# Patient Record
Sex: FEMALE | Race: BLACK OR AFRICAN AMERICAN | Employment: UNEMPLOYED | URBAN - METROPOLITAN AREA
[De-identification: names, ages, dates, MRNs, and addresses within clinical notes are randomized per-mention and may not be internally consistent; named-entity substitution may affect disease eponyms.]

---

## 2017-01-12 RX ORDER — BUTALBITAL, ACETAMINOPHEN AND CAFFEINE 50; 325; 40 MG/1; MG/1; MG/1
TABLET ORAL
Qty: 30 TAB | Refills: 0 | OUTPATIENT
Start: 2017-01-12

## 2017-01-13 RX ORDER — BUTALBITAL, ACETAMINOPHEN AND CAFFEINE 50; 325; 40 MG/1; MG/1; MG/1
TABLET ORAL
Qty: 30 TAB | Refills: 0 | Status: SHIPPED | OUTPATIENT
Start: 2017-01-13 | End: 2017-05-01 | Stop reason: SDUPTHER

## 2017-01-13 NOTE — TELEPHONE ENCOUNTER
Pt called back and said something that I could not understand and said she will take it over her( Dr. Priyanka Cordero) head, and hung up the phone.

## 2017-01-13 NOTE — TELEPHONE ENCOUNTER
I refilled this but she has not been seen since 12/2015  Please call and schedule f/u with Antonia Lazo or Mya Morfin for further refills to be done  Needs her at least yearly f/u visit  Thanks

## 2017-01-13 NOTE — TELEPHONE ENCOUNTER
Pt called at the number on file. Nurse informed the pt, the medication that was requested, was not approved, pt verbalize that was not right and became upset about it. A appointment was offered to the pt, because the pt had not been seen since 10/5/2017, and told that she could talk to Dr. Branden Du about the medication. Pt stated, \" That's (BS) and I'll find another doctor. \" Then the phone got hung up.

## 2017-01-18 ENCOUNTER — TELEPHONE (OUTPATIENT)
Dept: NEUROLOGY | Age: 62
End: 2017-01-18

## 2017-01-30 DIAGNOSIS — I10 ESSENTIAL HYPERTENSION WITH GOAL BLOOD PRESSURE LESS THAN 130/80: ICD-10-CM

## 2017-01-30 DIAGNOSIS — M79.7 FIBROMYALGIA: ICD-10-CM

## 2017-01-30 DIAGNOSIS — M54.50 MIDLINE LOW BACK PAIN WITHOUT SCIATICA, UNSPECIFIED CHRONICITY: ICD-10-CM

## 2017-01-30 RX ORDER — AMLODIPINE BESYLATE 10 MG/1
TABLET ORAL
Qty: 90 TAB | Refills: 1 | Status: SHIPPED | OUTPATIENT
Start: 2017-01-30 | End: 2017-04-07 | Stop reason: SDUPTHER

## 2017-01-30 RX ORDER — GABAPENTIN 300 MG/1
CAPSULE ORAL
Qty: 270 CAP | Refills: 1 | Status: SHIPPED | OUTPATIENT
Start: 2017-01-30 | End: 2017-04-07 | Stop reason: SDUPTHER

## 2017-01-30 RX ORDER — TOPIRAMATE 100 MG/1
TABLET, FILM COATED ORAL
Qty: 90 TAB | Refills: 1 | Status: SHIPPED | OUTPATIENT
Start: 2017-01-30 | End: 2017-04-07 | Stop reason: SDUPTHER

## 2017-01-30 RX ORDER — METOPROLOL SUCCINATE 25 MG/1
TABLET, EXTENDED RELEASE ORAL
Qty: 90 TAB | Refills: 1 | Status: SHIPPED | OUTPATIENT
Start: 2017-01-30 | End: 2017-04-07 | Stop reason: SDUPTHER

## 2017-02-20 ENCOUNTER — OFFICE VISIT (OUTPATIENT)
Dept: FAMILY MEDICINE CLINIC | Age: 62
End: 2017-02-20

## 2017-02-20 VITALS
HEART RATE: 86 BPM | TEMPERATURE: 97.2 F | HEIGHT: 70 IN | WEIGHT: 243.8 LBS | SYSTOLIC BLOOD PRESSURE: 146 MMHG | OXYGEN SATURATION: 96 % | RESPIRATION RATE: 19 BRPM | DIASTOLIC BLOOD PRESSURE: 97 MMHG | BODY MASS INDEX: 34.9 KG/M2

## 2017-02-20 DIAGNOSIS — J30.1 SEASONAL ALLERGIC RHINITIS DUE TO POLLEN: ICD-10-CM

## 2017-02-20 DIAGNOSIS — J45.20 ALLERGIC BRONCHITIS, MILD INTERMITTENT, UNCOMPLICATED: Primary | ICD-10-CM

## 2017-02-20 DIAGNOSIS — Z78.9 DISCOMFORT: ICD-10-CM

## 2017-02-20 PROBLEM — J45.909 ALLERGIC BRONCHITIS: Status: ACTIVE | Noted: 2017-02-20

## 2017-02-20 RX ORDER — IBUPROFEN 600 MG/1
600 TABLET ORAL
Qty: 40 TAB | Refills: 2 | Status: SHIPPED | OUTPATIENT
Start: 2017-02-20

## 2017-02-20 RX ORDER — FEXOFENADINE HCL 60 MG
60 TABLET ORAL 2 TIMES DAILY
Qty: 60 TAB | Refills: 11 | COMMUNITY
Start: 2017-02-20 | End: 2017-11-09 | Stop reason: ALTCHOICE

## 2017-02-20 NOTE — PROGRESS NOTES
HISTORY OF PRESENT ILLNESS  Jovan Eason is a 59 y.o. female. HPI  Cough with clear mucous x 2 weeks. Using albuterol without relief. Takes mucinex DM no help. No fever. No allergen exposure. Last episode of this was 6 mos ago, she was given albut by Dr Keysha Momin,   Lasted for weeks. Pt did not cough during interview or exam.       ROS  ROS:  Feeling poorly due to cough. No dyspnea or chest pain on exertion. No abdominal pain, change in bowel habits, black or bloody stools. No urinary tract symptoms. No neurological complaints. Physical Exam  Gen: Oriented to person, place and time and well-developed, well-nourished and in no distress. She sways back and forth constantly during exam.   HEENT:    Head: normocephalic and atraumatic. Eyes:  EOM are normal. Pupils equal and round. Neck:  Normal range of motion. Neck supple. Ears:  TMs and canals normal, no erythema, bulging or retraction  Mouth:  No posterior pharyngeal erythema or exudates. No tonsillar hypertrophy. Cardiovascular: normal rate, regular rhythm and normal heart sounds. Pulmonary/Chest:  Effort normal and breath sounds normal.  No respiratory distress. No wheezes, no rales. Abdominal: soft, normal  bowel sounds. Musculoskeletal:  No edema, no tenderness. No calf tenderness or edema. Neurological:  Alert, oriented to person, place and time. Skin: skin is warm and dry. ASSESSMENT and PLAN  Follow-up Disposition:  Return in about 1 month (around 3/20/2017) for follow up allergic bronchitis. 1. Discomfort     - ibuprofen (MOTRIN) 600 mg tablet; Take 1 Tab by mouth every six (6) hours as needed for Pain. Dispense: 40 Tab; Refill: 2    2. Seasonal allergic rhinitis due to pollen     - fexofenadine (ALLEGRA) 60 mg tablet; Take 1 Tab by mouth two (2) times a day. For allergy  Dispense: 60 Tab; Refill: 11    3.  Allergic bronchitis, mild intermittent, uncomplicated     I  have discussed the diagnosis with the patient and the intended treatment plan as seen in the above orders. Patient has provided input and agrees with goals. The patient has received an after-visit summary and questions were answered concerning future plans. I have discussed medication side effects and warnings with the patient as well.

## 2017-02-20 NOTE — MR AVS SNAPSHOT
Visit Information Date & Time Provider Department Dept. Phone Encounter #  
 2/20/2017  2:15 PM Sissy Mcfadden MD [address] [place] [address] [id number] Follow-up Instructions Return in about 1 month (around 3/20/2017) for follow up allergic bronchitis. Upcoming Health Maintenance Date Due Hepatitis C Screening 1955 DTaP/Tdap/Td series (1 - Tdap) 6/24/1974 PAP AKA CERVICAL CYTOLOGY 6/24/1974 ZOSTER VACCINE AGE 60> 6/24/2013 INFLUENZA AGE 9 TO ADULT 8/1/2016 FOBT Q 1 YEAR AGE 50-75 11/19/2016 BREAST CANCER SCRN MAMMOGRAM 12/29/2017 Allergies as of 2/20/2017  Review Complete On: 2/20/2017 By: Sissy Mcfadden MD  
 No Known Allergies Current Immunizations  Never Reviewed No immunizations on file. Not reviewed this visit You Were Diagnosed With   
  
 Codes Comments Allergic bronchitis, mild intermittent, uncomplicated    -  Primary ICD-10-CM: J45.20 ICD-9-CM: 493.00 Discomfort     ICD-10-CM: Z78.9 ICD-9-CM: V49.89 Seasonal allergic rhinitis due to pollen     ICD-10-CM: J30.1 ICD-9-CM: 477.0 Vitals BP Pulse Temp Resp Height(growth percentile) Weight(growth percentile) (!) 146/97 86 97.2 °F (36.2 °C) (Oral) 19 5' 10\" (1.778 m) 243 lb 12.8 oz (110.6 kg) SpO2 BMI OB Status Smoking Status 96% 34.98 kg/m2 Postmenopausal Former Smoker BMI and BSA Data Body Mass Index Body Surface Area 34.98 kg/m 2 2.34 m 2 Preferred Pharmacy Pharmacy Name Phone [facility] [address] [phone] Your Updated Medication List  
  
   
This list is accurate as of: 2/20/17  2:51 PM.  Always use your most recent med list.  
  
  
  
  
 albuterol 90 mcg/actuation inhaler Commonly known as:  PROVENTIL HFA, VENTOLIN HFA, PROAIR HFA Take 2 Puffs by inhalation every six (6) hours as needed for Wheezing. amLODIPine 10 mg tablet Commonly known as:  Voncile [place] Take 1 tablet by mouth  daily  
  
 butalbital-acetaminophen-caffeine -40 mg per tablet Commonly known as:  FIORICET, ESGIC  
take 1 tablet by mouth if needed --NOT MORE THAN 2 TABLETS IN 24 HOURS AND NO MORE THAN 2 DAYS A WEEK-NEEDS OFFICE VISIT FOR MORE REFILLS  
  
 cyclobenzaprine 10 mg tablet Commonly known as:  FLEXERIL Take 1 Tab by mouth three (3) times daily (with meals). diclofenac EC 75 mg EC tablet Commonly known as:  VOLTAREN Take 1 Tab by mouth two (2) times a day. fexofenadine 60 mg tablet Commonly known as:  Jovita Peat Take 1 Tab by mouth two (2) times a day. For allergy  
  
 fluticasone 50 mcg/actuation nasal spray Commonly known as:  Marsh Fails Use 2 sprays in each  nostril daily  
  
 gabapentin 300 mg capsule Commonly known as:  NEURONTIN Take 1 capsule by mouth 3  times daily  
  
 hydrOXYzine pamoate 25 mg capsule Commonly known as:  VISTARIL  
take 1 capsule by mouth if needed (NOT TO EXCEED 100 MG IN ONE DAY) ibuprofen 600 mg tablet Commonly known as:  MOTRIN Take 1 Tab by mouth every six (6) hours as needed for Pain.  
  
 magnesium oxide 400 mg tablet Commonly known as:  MAG-OX Take 1 Tab by mouth daily. metoprolol succinate 25 mg XL tablet Commonly known as:  TOPROL-XL Take 1 tablet by mouth once daily  
  
 topiramate 100 mg tablet Commonly known as:  TOPAMAX Take 1 tablet by mouth  daily Prescriptions Sent to Pharmacy Refills  
 ibuprofen (MOTRIN) 600 mg tablet 2 Sig: Take 1 Tab by mouth every six (6) hours as needed for Pain. Class: Normal  
 Pharmacy: [id number], [address] [facility] Ph #: [phone] Route: Oral  
  
Follow-up Instructions Return in about 1 month (around 3/20/2017) for follow up allergic bronchitis. Patient Instructions Purchase OTC Mucinex DM for cough relief. Take ibuprofen for pain, order sent to pharmacy. Continue allegra. Continue home multi - symptom night cough syrup. You may need to see an allergist in the future. Managing Your Allergies: Care Instructions Your Care Instructions Managing your allergies is an important part of staying healthy. Your doctor will help you find out what may be causing the allergies. Common causes of allergy symptoms are house dust and dust mites, animal dander, mold, and pollen. As soon as you know what triggers your symptoms, try to reduce your exposure to your triggers. This can help prevent allergy symptoms, asthma, and other health problems. Ask your doctor about allergy medicine or immunotherapy. These treatments may help reduce or prevent allergy symptoms. Follow-up care is a key part of your treatment and safety. Be sure to make and go to all appointments, and call your doctor if you are having problems. It's also a good idea to know your test results and keep a list of the medicines you take. How can you care for yourself at home? · If you think that dust or dust mites are causing your allergies: 
¨ Wash sheets, pillowcases, and other bedding every week in hot water. ¨ Use airtight, dust-proof covers for pillows, duvets, and mattresses. Avoid plastic covers, because they tend to tear quickly and do not \"breathe. \" Wash according to the instructions. ¨ Remove extra blankets and pillows that you don't need. ¨ Use blankets that are machine-washable. ¨ Don't use home humidifiers. They can help mites live longer. · Use air-conditioning. Change or clean all filters every month. Keep windows closed. Use high-efficiency air filters. Don't use window or attic fans, which draw dust into the air. · If you're allergic to pet dander, keep pets outside or, at the very least, out of your bedroom. Old carpet and cloth-covered furniture can hold a lot of animal dander. You may need to replace them. · Look for signs of cockroaches. Use cockroach baits to get rid of them. Then clean your home well. · If you're allergic to mold, don't keep indoor plants, because molds can grow in soil. Get rid of furniture, rugs, and drapes that smell musty. Check for mold in the bathroom. · If you're allergic to pollen, stay inside when pollen counts are high. · Don't smoke or let anyone else smoke in your house. Don't use fireplaces or wood-burning stoves. Avoid paint fumes, perfumes, and other strong odors. When should you call for help? Give an epinephrine shot if: 
· You think you are having a severe allergic reaction. · You have symptoms in more than one body area, such as mild nausea and an itchy mouth. After giving an epinephrine shot call 911, even if you feel better. Call 911 if: 
· You have symptoms of a severe allergic reaction. These may include: 
¨ Sudden raised, red areas (hives) all over your body. ¨ Swelling of the throat, mouth, lips, or tongue. ¨ Trouble breathing. ¨ Passing out (losing consciousness). Or you may feel very lightheaded or suddenly feel weak, confused, or restless. · You have been given an epinephrine shot, even if you feel better. Call your doctor now or seek immediate medical care if: 
· You have symptoms of an allergic reaction, such as: ¨ A rash or hives (raised, red areas on the skin). ¨ Itching. ¨ Swelling. ¨ Belly pain, nausea, or vomiting. Watch closely for changes in your health, and be sure to contact your doctor if: 
· Your allergies get worse. · You need help controlling your allergies. · You have questions about allergy testing. · You do not get better as expected. Where can you learn more? Go to http://jocelyn-sisi.info/. Enter L249 in the search box to learn more about \"Managing Your Allergies: Care Instructions. \" Current as of: February 12, 2016 Content Version: 11.1 © 1363-3364 Healthwise, Incorporated. Care instructions adapted under license by [employer] (which disclaims liability or warranty for this information). If you have questions about a medical condition or this instruction, always ask your healthcare professional. [address] any warranty or liability for your use of this information. Introducing [facility]! Tanya Wynne introduces Lumoid patient portal. Now you can access parts of your medical record, email your doctor's office, and request medication refills online. 1. In your internet browser, go to [email or web address] 2. Click on the First Time User? Click Here link in the Sign In box. You will see the New Member Sign Up page. 3. Enter your Lumoid Access Code exactly as it appears below. You will not need to use this code after youve completed the sign-up process. If you do not sign up before the expiration date, you must request a new code. · Lumoid Access Code: [email or web address] Expires: 5/21/2017  2:03 PM 
 
4. Enter the last four digits of your Social Security Number (xxxx) and Date of Birth (mm/dd/yyyy) as indicated and click Submit. You will be taken to the next sign-up page. 5. Create a Lumoid ID. This will be your Lumoid login ID and cannot be changed, so think of one that is secure and easy to remember. 6. Create a Lumoid password. You can change your password at any time. 7. Enter your Password Reset Question and Answer. This can be used at a later time if you forget your password. 8. Enter your e-mail address. You will receive e-mail notification when new information is available in [address] 9. Click Sign Up. You can now view and download portions of your medical record. 10. Click the Download Summary menu link to download a portable copy of your medical information.  
 
If you have questions, please visit the Frequently Asked Questions section of the [employer]. Remember, [employer] is NOT to be used for urgent needs. For medical emergencies, dial 911. Now available from your iPhone and Android! Please provide this summary of care documentation to your next provider. Your primary care clinician is listed as Estelle Ocampo. If you have any questions after today's visit, please call [phone].

## 2017-02-20 NOTE — PROGRESS NOTES
Chief Complaint   Patient presents with    Establish Care    Cough     Pt here to establish care with primary care     Pt C/O hard cough was DX with bronchitis by  but wants a second opinion and wants a different inhaler      Pt c/o chronic body pain all over from multiple issues states  gave her Zanaflex which to the pt is to strong pt was also given flexeril which she states is not strong enough.

## 2017-03-13 ENCOUNTER — TELEPHONE (OUTPATIENT)
Dept: FAMILY MEDICINE CLINIC | Age: 62
End: 2017-03-13

## 2017-03-13 NOTE — TELEPHONE ENCOUNTER
Spoke with pt to advise her that she needs to be seen in order to have a referral she would need to be evaluated by the provider. Pt stated that she was not going to be seen by the doctor since she knew what was wrong with her, she would pay on her own to be seen! And hung up on the nurse.

## 2017-03-13 NOTE — TELEPHONE ENCOUNTER
Patient called requesting a referral for Neurology, Dr. Sara Munoz for her foot.  Please advise patient contact [phone]

## 2017-03-15 ENCOUNTER — OFFICE VISIT (OUTPATIENT)
Dept: NEUROLOGY | Age: 62
End: 2017-03-15

## 2017-03-15 VITALS
OXYGEN SATURATION: 95 % | DIASTOLIC BLOOD PRESSURE: 102 MMHG | HEART RATE: 71 BPM | BODY MASS INDEX: 35.44 KG/M2 | SYSTOLIC BLOOD PRESSURE: 162 MMHG | WEIGHT: 247 LBS

## 2017-03-15 DIAGNOSIS — M54.42 ACUTE LEFT-SIDED LOW BACK PAIN WITH LEFT-SIDED SCIATICA: ICD-10-CM

## 2017-03-15 DIAGNOSIS — M54.42 ACUTE LEFT-SIDED LOW BACK PAIN WITH LEFT-SIDED SCIATICA: Primary | ICD-10-CM

## 2017-03-15 RX ORDER — TRAMADOL HYDROCHLORIDE 50 MG/1
50 TABLET ORAL
Qty: 60 TAB | Refills: 0 | Status: SHIPPED | OUTPATIENT
Start: 2017-03-15 | End: 2017-03-15 | Stop reason: SDUPTHER

## 2017-03-15 RX ORDER — TRAMADOL HYDROCHLORIDE 50 MG/1
50 TABLET ORAL
Qty: 60 TAB | Refills: 0 | Status: SHIPPED | OUTPATIENT
Start: 2017-03-15 | End: 2017-05-12 | Stop reason: SDUPTHER

## 2017-03-15 RX ORDER — TIZANIDINE 4 MG/1
4 TABLET ORAL AS NEEDED
Refills: 0 | COMMUNITY
Start: 2017-02-03 | End: 2017-06-20 | Stop reason: SDUPTHER

## 2017-03-15 NOTE — MR AVS SNAPSHOT
Visit Information Date & Time Provider Department Dept. Phone Encounter #  
 3/15/2017  9:00 AM Jonathan Banegas, [address] [facility] [phone] [id number] Follow-up Instructions Return if symptoms worsen or fail to improve. Upcoming Health Maintenance Date Due Hepatitis C Screening 1955 DTaP/Tdap/Td series (1 - Tdap) 6/24/1974 PAP AKA CERVICAL CYTOLOGY 6/24/1974 ZOSTER VACCINE AGE 60> 6/24/2013 INFLUENZA AGE 9 TO ADULT 8/1/2016 FOBT Q 1 YEAR AGE 50-75 11/19/2016 BREAST CANCER SCRN MAMMOGRAM 12/29/2017 Allergies as of 3/15/2017  Review Complete On: 3/15/2017 By: Michelle Grant LPN No Known Allergies Current Immunizations  Never Reviewed No immunizations on file. Not reviewed this visit You Were Diagnosed With   
  
 Codes Comments Acute left-sided low back pain with left-sided sciatica    -  Primary ICD-10-CM: M54.42 
ICD-9-CM: 724.2, 724.3 Vitals BP Pulse Weight(growth percentile) SpO2 BMI OB Status (!) 162/102 71 247 lb (112 kg) 95% 35.44 kg/m2 Postmenopausal  
 Smoking Status Former Smoker BMI and BSA Data Body Mass Index Body Surface Area  
 35.44 kg/m 2 2.35 m 2 Preferred Pharmacy Pharmacy Name Phone [facility] [address] [phone] Your Updated Medication List  
  
   
This list is accurate as of: 3/15/17 10:12 AM.  Always use your most recent med list.  
  
  
  
  
 albuterol 90 mcg/actuation inhaler Commonly known as:  PROVENTIL HFA, VENTOLIN HFA, PROAIR HFA Take 2 Puffs by inhalation every six (6) hours as needed for Wheezing. amLODIPine 10 mg tablet Commonly known as:  Arlyss Dania Take 1 tablet by mouth  daily  
  
 butalbital-acetaminophen-caffeine -40 mg per tablet Commonly known as:  Leilani Joann  
 take 1 tablet by mouth if needed --NOT MORE THAN 2 TABLETS IN 24 HOURS AND NO MORE THAN 2 DAYS A WEEK-NEEDS OFFICE VISIT FOR MORE REFILLS  
  
 cyclobenzaprine 10 mg tablet Commonly known as:  FLEXERIL Take 1 Tab by mouth three (3) times daily (with meals). diclofenac EC 75 mg EC tablet Commonly known as:  VOLTAREN Take 1 Tab by mouth two (2) times a day. fexofenadine 60 mg tablet Commonly known as:  Jimmy Flax Take 1 Tab by mouth two (2) times a day. For allergy  
  
 fluticasone 50 mcg/actuation nasal spray Commonly known as:  Cathlyn Jeffery Use 2 sprays in each  nostril daily  
  
 gabapentin 300 mg capsule Commonly known as:  NEURONTIN Take 1 capsule by mouth 3  times daily  
  
 hydrOXYzine pamoate 25 mg capsule Commonly known as:  VISTARIL  
take 1 capsule by mouth if needed (NOT TO EXCEED 100 MG IN ONE DAY) ibuprofen 600 mg tablet Commonly known as:  MOTRIN Take 1 Tab by mouth every six (6) hours as needed for Pain.  
  
 loratadine 10 mg Cap Take  by mouth.  
  
 magnesium oxide 400 mg tablet Commonly known as:  MAG-OX Take 1 Tab by mouth daily. metoprolol succinate 25 mg XL tablet Commonly known as:  TOPROL-XL Take 1 tablet by mouth once daily tiZANidine 4 mg tablet Commonly known as:  Jesisca Mule Take 4 mg by mouth as needed. topiramate 100 mg tablet Commonly known as:  TOPAMAX Take 1 tablet by mouth  daily  
  
 traMADol 50 mg tablet Commonly known as:  ULTRAM  
Take 1 Tab by mouth every six (6) hours as needed for Pain. Max Daily Amount: 200 mg. Prescriptions Printed Refills  
 traMADol (ULTRAM) 50 mg tablet 0 Sig: Take 1 Tab by mouth every six (6) hours as needed for Pain. Max Daily Amount: 200 mg. Class: Print Route: Oral  
  
Follow-up Instructions Return if symptoms worsen or fail to improve. To-Do List   
 03/15/2017 Imaging:  MRI LUMB SPINE WO CONT Patient Instructions PRESCRIPTION REFILL POLICY [facility] Statement to Patients April 1, 2014 In an effort to ensure the large volume of patient prescription refills is processed in the most efficient and expeditious manner, we are asking our patients to assist us by calling your Pharmacy for all prescription refills, this will include also your  Mail Order Pharmacy. The pharmacy will contact our office electronically to continue the refill process. Please do not wait until the last minute to call your pharmacy. We need at least 48 hours (2days) to fill prescriptions. We also encourage you to call your pharmacy before going to  your prescription to make sure it is ready. With regard to controlled substance prescription refill requests (narcotic refills) that need to be picked up at our office, we ask your cooperation by providing us with at least 72 hours (3days) notice that you will need a refill. We will not refill narcotic prescription refill requests after 4:00pm on any weekday, Monday through Thursday, or after 2:00pm on Fridays, or on the weekends. We encourage everyone to explore another way of getting your prescription refill request processed using [employer], our patient web portal through our electronic medical record system. [employer] is an efficient and effective way to communicate your medication request directly to the office and  downloadable as an jamie on your smart phone . [employer] also features a review functionality that allows you to view your medication list as well as leave messages for your physician. Are you ready to get connected? If so please review the attatched instructions or speak to any of our staff to get you set up right away! Thank you so much for your cooperation. Should you have any questions please contact our Practice Administrator. The Physicians and Staff,  [facility]! Aidan Avila introduces [employer] Website patient portal. Now you can access parts of your medical record, email your doctor's office, and request medication refills online. 1. In your internet browser, go to [email or web address] 2. Click on the First Time User? Click Here link in the Sign In box. You will see the New Member Sign Up page. 3. Enter your [employer] Website Access Code exactly as it appears below. You will not need to use this code after youve completed the sign-up process. If you do not sign up before the expiration date, you must request a new code. · [employer] Website Access Code: [email or web address] Expires: 5/21/2017  3:03 PM 
 
4. Enter the last four digits of your Social Security Number (xxxx) and Date of Birth (mm/dd/yyyy) as indicated and click Submit. You will be taken to the next sign-up page. 5. Create a [employer] Website ID. This will be your [employer] Website login ID and cannot be changed, so think of one that is secure and easy to remember. 6. Create a [employer] Website password. You can change your password at any time. 7. Enter your Password Reset Question and Answer. This can be used at a later time if you forget your password. 8. Enter your e-mail address. You will receive e-mail notification when new information is available in [address] 9. Click Sign Up. You can now view and download portions of your medical record. 10. Click the Download Summary menu link to download a portable copy of your medical information. If you have questions, please visit the Frequently Asked Questions section of the [employer] Website website. Remember, [employer] Website is NOT to be used for urgent needs. For medical emergencies, dial 911. Now available from your iPhone and Android! Please provide this summary of care documentation to your next provider. Your primary care clinician is listed as Elijah Holley. If you have any questions after today's visit, please call [phone].
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
## 2017-03-16 ENCOUNTER — TELEPHONE (OUTPATIENT)
Dept: NEUROLOGY | Age: 62
End: 2017-03-16

## 2017-03-16 NOTE — TELEPHONE ENCOUNTER
----- Message from Sena Meza sent at 3/16/2017  2:37 PM EDT -----  Regarding: Dr. Deanna Pa  Pt called concerning the appointment that she had yesterday with the provider. She is also requesting the .  Pt number [phone]

## 2017-03-18 DIAGNOSIS — J30.1 ALLERGIC RHINITIS DUE TO POLLEN: ICD-10-CM

## 2017-03-18 DIAGNOSIS — R09.82 POST-NASAL DRAINAGE: ICD-10-CM

## 2017-03-19 RX ORDER — FLUTICASONE PROPIONATE 50 MCG
SPRAY, SUSPENSION (ML) NASAL
Qty: 48 G | Refills: 3 | Status: SHIPPED | OUTPATIENT
Start: 2017-03-19

## 2017-03-21 ENCOUNTER — HOSPITAL ENCOUNTER (OUTPATIENT)
Dept: MRI IMAGING | Age: 62
Discharge: HOME | End: 2017-03-21
Attending: PSYCHIATRY & NEUROLOGY
Payer: MEDICARE

## 2017-03-21 ENCOUNTER — TELEPHONE (OUTPATIENT)
Dept: NEUROLOGY | Age: 62
End: 2017-03-21

## 2017-03-21 DIAGNOSIS — M54.42 ACUTE LEFT-SIDED LOW BACK PAIN WITH LEFT-SIDED SCIATICA: ICD-10-CM

## 2017-03-21 PROCEDURE — 72148 MRI LUMBAR SPINE W/O DYE: CPT

## 2017-03-21 NOTE — TELEPHONE ENCOUNTER
----- Message from Hui Zheng sent at 3/21/2017  2:08 PM EDT -----  Regarding: Dr. Parry Mortimer  : Pt returning call to Dr. Eveline Woods  nurse. Pt states, referred to [facility] does not have  name or the location to whom she is being referred so she can set her appt. Please give a call back . Best call back [phone].

## 2017-03-21 NOTE — TELEPHONE ENCOUNTER
----- Message from Yara Francisco sent at 3/21/2017  2:52 PM EDT -----  Regarding: Dr. Ta Strickland  Pt P) [phone]  or Birdie [phone] tried to schedule with the neurosurgeon but did not have a name or location that the MRI results were sent to so was unable to schedule the appointment. Pt would like to go to the [address] [place] [address] (p) [phone] location.

## 2017-03-21 NOTE — TELEPHONE ENCOUNTER
Notified patient MRI lumbar spine showing severe narrowing of the left foramen and narrowing of the left lateral recess posterior to L4. Patient verbalized understanding. Patient will f/u [facility].

## 2017-03-23 DIAGNOSIS — M79.7 FIBROMYALGIA: ICD-10-CM

## 2017-03-23 DIAGNOSIS — I10 ESSENTIAL HYPERTENSION WITH GOAL BLOOD PRESSURE LESS THAN 130/80: ICD-10-CM

## 2017-03-23 DIAGNOSIS — M54.50 MIDLINE LOW BACK PAIN WITHOUT SCIATICA, UNSPECIFIED CHRONICITY: ICD-10-CM

## 2017-03-23 DIAGNOSIS — J30.89 OTHER ALLERGIC RHINITIS: ICD-10-CM

## 2017-03-23 RX ORDER — GABAPENTIN 300 MG/1
CAPSULE ORAL
Qty: 270 CAP | Refills: 1 | Status: CANCELLED | OUTPATIENT
Start: 2017-03-23

## 2017-03-23 RX ORDER — METOPROLOL SUCCINATE 25 MG/1
TABLET, EXTENDED RELEASE ORAL
Qty: 90 TAB | Refills: 1 | Status: CANCELLED | OUTPATIENT
Start: 2017-03-23

## 2017-03-23 RX ORDER — TOPIRAMATE 100 MG/1
TABLET, FILM COATED ORAL
Qty: 90 TAB | Refills: 1 | Status: CANCELLED | OUTPATIENT
Start: 2017-03-23

## 2017-03-23 RX ORDER — AMLODIPINE BESYLATE 10 MG/1
TABLET ORAL
Qty: 90 TAB | Refills: 1 | Status: CANCELLED | OUTPATIENT
Start: 2017-03-23

## 2017-03-23 RX ORDER — HYDROXYZINE PAMOATE 25 MG/1
CAPSULE ORAL
Qty: 90 CAP | Refills: 0 | Status: CANCELLED | OUTPATIENT
Start: 2017-03-23

## 2017-04-07 DIAGNOSIS — I10 ESSENTIAL HYPERTENSION: ICD-10-CM

## 2017-04-07 DIAGNOSIS — I10 ESSENTIAL HYPERTENSION: Primary | ICD-10-CM

## 2017-04-07 DIAGNOSIS — M54.50 MIDLINE LOW BACK PAIN WITHOUT SCIATICA, UNSPECIFIED CHRONICITY: ICD-10-CM

## 2017-04-07 DIAGNOSIS — M79.7 FIBROMYALGIA: ICD-10-CM

## 2017-04-07 DIAGNOSIS — I10 ESSENTIAL HYPERTENSION WITH GOAL BLOOD PRESSURE LESS THAN 130/80: ICD-10-CM

## 2017-04-07 RX ORDER — HYDROXYZINE PAMOATE 25 MG/1
25 CAPSULE ORAL
Qty: 180 CAP | Refills: 0 | Status: CANCELLED | OUTPATIENT
Start: 2017-04-07

## 2017-04-07 RX ORDER — AMLODIPINE BESYLATE 10 MG/1
TABLET ORAL
Qty: 90 TAB | Refills: 0 | Status: SHIPPED | COMMUNITY
Start: 2017-04-07 | End: 2017-06-20 | Stop reason: SDUPTHER

## 2017-04-07 RX ORDER — AMLODIPINE BESYLATE 10 MG/1
TABLET ORAL
Qty: 90 TAB | Refills: 0 | Status: CANCELLED | OUTPATIENT
Start: 2017-04-07

## 2017-04-07 RX ORDER — METOPROLOL SUCCINATE 25 MG/1
25 TABLET, EXTENDED RELEASE ORAL DAILY
Qty: 90 TAB | Refills: 0 | Status: CANCELLED | OUTPATIENT
Start: 2017-04-07

## 2017-04-07 RX ORDER — GABAPENTIN 300 MG/1
300 CAPSULE ORAL 3 TIMES DAILY
Qty: 270 CAP | Refills: 0 | Status: CANCELLED | OUTPATIENT
Start: 2017-04-07

## 2017-04-07 RX ORDER — METOPROLOL SUCCINATE 25 MG/1
25 TABLET, EXTENDED RELEASE ORAL DAILY
Qty: 30 TAB | Refills: 0 | Status: SHIPPED | OUTPATIENT
Start: 2017-04-07 | End: 2017-04-07 | Stop reason: SDUPTHER

## 2017-04-07 RX ORDER — TOPIRAMATE 100 MG/1
TABLET, FILM COATED ORAL
Qty: 90 TAB | Refills: 0 | Status: SHIPPED | COMMUNITY
Start: 2017-04-07 | End: 2017-06-20 | Stop reason: SDUPTHER

## 2017-04-07 RX ORDER — GABAPENTIN 300 MG/1
300 CAPSULE ORAL 3 TIMES DAILY
Qty: 270 CAP | Refills: 0 | Status: SHIPPED | COMMUNITY
Start: 2017-04-07 | End: 2017-06-20 | Stop reason: SDUPTHER

## 2017-04-07 RX ORDER — HYDROXYZINE PAMOATE 25 MG/1
25 CAPSULE ORAL
Qty: 180 CAP | Refills: 0 | Status: SHIPPED | COMMUNITY
Start: 2017-04-07 | End: 2017-06-20 | Stop reason: SDUPTHER

## 2017-04-07 RX ORDER — METOPROLOL SUCCINATE 25 MG/1
25 TABLET, EXTENDED RELEASE ORAL DAILY
Qty: 90 TAB | Refills: 0 | Status: SHIPPED | COMMUNITY
Start: 2017-04-07 | End: 2017-06-20 | Stop reason: SDUPTHER

## 2017-04-07 RX ORDER — TOPIRAMATE 100 MG/1
TABLET, FILM COATED ORAL
Qty: 90 TAB | Refills: 0 | Status: CANCELLED | OUTPATIENT
Start: 2017-04-07

## 2017-04-08 ENCOUNTER — TELEPHONE (OUTPATIENT)
Dept: FAMILY MEDICINE CLINIC | Age: 62
End: 2017-04-08

## 2017-04-08 NOTE — TELEPHONE ENCOUNTER
----- Message from Abhishek Rich sent at 4/7/2017 12:15 PM EDT -----  Regarding: Dr. Valdez Choi  Patient needs refills on Amlodipine Besylate 10 mgs, Gabapentin 300 mgs, Metoprolo 25 mgs, Hydroxyzine/jennifer 25 mgs sent to [address] at [phone].

## 2017-04-11 ENCOUNTER — TELEPHONE (OUTPATIENT)
Dept: FAMILY MEDICINE CLINIC | Age: 62
End: 2017-04-11

## 2017-04-11 NOTE — TELEPHONE ENCOUNTER
----- Message from Zelda Gaucher sent at 4/10/2017  4:23 PM EDT -----  Regarding: Dr. Toby Tracy  Pt requesting if MRI results was received. Pt best contact number [phone].

## 2017-04-11 NOTE — TELEPHONE ENCOUNTER
----- Message from Krissy Collier sent at 4/11/2017 12:56 PM EDT -----  Regarding: /telephone  Contact: [phone]  Pt is requesting a call back regarding getting MRI results from Neurosurgeon so she get a referral. Neurologist Said pt has severe narrowing of the foramina and bulging disk at 3rd and 4th vertebrae.  Pt's best contact number is [phone]

## 2017-04-11 NOTE — TELEPHONE ENCOUNTER
I called pt. She has seen neurologist, had MRI, MRI report sent to neurosurgery. She needs referral to neurosurgery. Advised pt to see Dr Ashwini Simmons for any future evals or referrals, as I have seen pt only once in past and did not eval her for pain. D/w pt that [place] office closes this month. The patient agrees with plan and verbalizes understanding.

## 2017-05-04 ENCOUNTER — TELEPHONE (OUTPATIENT)
Dept: NEUROLOGY | Age: 62
End: 2017-05-04

## 2017-05-04 RX ORDER — BUTALBITAL, ACETAMINOPHEN AND CAFFEINE 50; 325; 40 MG/1; MG/1; MG/1
TABLET ORAL
Qty: 30 TAB | Refills: 0 | Status: SHIPPED | OUTPATIENT
Start: 2017-05-04 | End: 2017-07-01 | Stop reason: SDUPTHER

## 2017-05-12 DIAGNOSIS — M54.42 ACUTE LEFT-SIDED LOW BACK PAIN WITH LEFT-SIDED SCIATICA: ICD-10-CM

## 2017-05-12 RX ORDER — TRAMADOL HYDROCHLORIDE 50 MG/1
TABLET ORAL
Qty: 60 TAB | Refills: 0 | Status: SHIPPED | OUTPATIENT
Start: 2017-05-12 | End: 2018-09-07 | Stop reason: ALTCHOICE

## 2017-05-27 ENCOUNTER — HEALTH MAINTENANCE LETTER (OUTPATIENT)
Age: 62
End: 2017-05-27

## 2017-06-20 ENCOUNTER — OFFICE VISIT (OUTPATIENT)
Dept: FAMILY MEDICINE CLINIC | Age: 62
End: 2017-06-20

## 2017-06-20 VITALS
HEART RATE: 73 BPM | DIASTOLIC BLOOD PRESSURE: 84 MMHG | TEMPERATURE: 97 F | SYSTOLIC BLOOD PRESSURE: 125 MMHG | HEIGHT: 70 IN | OXYGEN SATURATION: 97 % | BODY MASS INDEX: 34.79 KG/M2 | WEIGHT: 243 LBS | RESPIRATION RATE: 20 BRPM

## 2017-06-20 DIAGNOSIS — A08.4 VIRAL GASTROENTERITIS: Primary | ICD-10-CM

## 2017-06-20 DIAGNOSIS — I10 ESSENTIAL HYPERTENSION WITH GOAL BLOOD PRESSURE LESS THAN 130/80: ICD-10-CM

## 2017-06-20 DIAGNOSIS — R09.82 POST-NASAL DRAINAGE: ICD-10-CM

## 2017-06-20 DIAGNOSIS — I10 ESSENTIAL HYPERTENSION: ICD-10-CM

## 2017-06-20 DIAGNOSIS — M79.7 FIBROMYALGIA: ICD-10-CM

## 2017-06-20 DIAGNOSIS — M54.42 ACUTE LEFT-SIDED LOW BACK PAIN WITH LEFT-SIDED SCIATICA: ICD-10-CM

## 2017-06-20 RX ORDER — HYDROXYZINE PAMOATE 25 MG/1
25 CAPSULE ORAL
Qty: 180 CAP | Refills: 1 | Status: SHIPPED | OUTPATIENT
Start: 2017-06-20 | End: 2017-11-06 | Stop reason: SDUPTHER

## 2017-06-20 RX ORDER — TIZANIDINE 4 MG/1
4 TABLET ORAL AS NEEDED
Qty: 30 TAB | Refills: 1 | Status: SHIPPED | OUTPATIENT
Start: 2017-06-20 | End: 2017-09-07 | Stop reason: SDUPTHER

## 2017-06-20 RX ORDER — DIPHENOXYLATE HYDROCHLORIDE AND ATROPINE SULFATE 2.5; .025 MG/1; MG/1
1 TABLET ORAL
Qty: 20 TAB | Refills: 0 | Status: SHIPPED | OUTPATIENT
Start: 2017-06-20

## 2017-06-20 RX ORDER — GABAPENTIN 300 MG/1
300 CAPSULE ORAL 3 TIMES DAILY
Qty: 270 CAP | Refills: 1 | Status: SHIPPED | OUTPATIENT
Start: 2017-06-20 | End: 2017-12-01 | Stop reason: SDUPTHER

## 2017-06-20 RX ORDER — TOPIRAMATE 100 MG/1
TABLET, FILM COATED ORAL
Qty: 90 TAB | Refills: 1 | Status: SHIPPED | OUTPATIENT
Start: 2017-06-20 | End: 2017-11-06 | Stop reason: SDUPTHER

## 2017-06-20 RX ORDER — METOPROLOL SUCCINATE 25 MG/1
25 TABLET, EXTENDED RELEASE ORAL DAILY
Qty: 90 TAB | Refills: 1 | Status: SHIPPED | OUTPATIENT
Start: 2017-06-20 | End: 2017-11-06 | Stop reason: SDUPTHER

## 2017-06-20 RX ORDER — AMLODIPINE BESYLATE 10 MG/1
TABLET ORAL
Qty: 90 TAB | Refills: 1 | Status: SHIPPED | OUTPATIENT
Start: 2017-06-20 | End: 2017-11-06 | Stop reason: SDUPTHER

## 2017-06-20 NOTE — PATIENT INSTRUCTIONS
Gastroenteritis: Care Instructions  Your Care Instructions  Gastroenteritis is an illness that may cause nausea, vomiting, and diarrhea. It is sometimes called \"stomach flu. \" It can be caused by bacteria or a virus. You will probably begin to feel better in 1 to 2 days. In the meantime, get plenty of rest and make sure you do not become dehydrated. Dehydration occurs when your body loses too much fluid. Follow-up care is a key part of your treatment and safety. Be sure to make and go to all appointments, and call your doctor if you are having problems. Its also a good idea to know your test results and keep a list of the medicines you take. How can you care for yourself at home? · If your doctor prescribed antibiotics, take them as directed. Do not stop taking them just because you feel better. You need to take the full course of antibiotics. · Drink plenty of fluids to prevent dehydration, enough so that your urine is light yellow or clear like water. Choose water and other caffeine-free clear liquids until you feel better. If you have kidney, heart, or liver disease and have to limit fluids, talk with your doctor before you increase your fluid intake. · Drink fluids slowly, in frequent, small amounts, because drinking too much too fast can cause vomiting. · Begin eating mild foods, such as dry toast, yogurt, applesauce, bananas, and rice. Avoid spicy, hot, or high-fat foods, and do not drink alcohol or caffeine for a day or two. Do not drink milk or eat ice cream until you are feeling better. How to prevent gastroenteritis  · Keep hot foods hot and cold foods cold. · Do not eat meats, dressings, salads, or other foods that have been kept at room temperature for more than 2 hours. · Use a thermometer to check your refrigerator. It should be between 34°F and 40°F.  · Defrost meats in the refrigerator or microwave, not on the kitchen counter. · Keep your hands and your kitchen clean.  Wash your hands, cutting boards, and countertops with hot soapy water frequently. · Cook meat until it is well done. · Do not eat raw eggs or uncooked sauces made with raw eggs. · Do not take chances. If food looks or tastes spoiled, throw it out. When should you call for help? Call 911 anytime you think you may need emergency care. For example, call if:  · You vomit blood or what looks like coffee grounds. · You passed out (lost consciousness). · You pass maroon or very bloody stools. Call your doctor now or seek immediate medical care if:  · You have severe belly pain. · You have signs of needing more fluids. You have sunken eyes, a dry mouth, and pass only a little dark urine. · You feel like you are going to faint. · You have increased belly pain that does not go away in 1 to 2 days. · You have new or increased nausea, or you are vomiting. · You have a new or higher fever. · Your stools are black and tarlike or have streaks of blood. Watch closely for changes in your health, and be sure to contact your doctor if:  · You are dizzy or lightheaded. · You urinate less than usual, or your urine is dark yellow or brown. · You do not feel better with each day that goes by. Where can you learn more? Go to http://jocelyn-sisi.info/. Enter N142 in the search box to learn more about \"Gastroenteritis: Care Instructions. \"  Current as of: March 3, 2017  Content Version: 11.3  © 6203-6879 [employer]. Care instructions adapted under license by [employer] (which disclaims liability or warranty for this information). If you have questions about a medical condition or this instruction, always ask your healthcare professional. [address] any warranty or liability for your use of this information.

## 2017-06-20 NOTE — MR AVS SNAPSHOT
Visit Information Date & Time Provider Department Dept. Phone Encounter #  
 6/20/2017 10:15 AM Marcela Reyes, SAMANTHA [address] [facility] [id number] Follow-up Instructions Return in about 4 weeks (around 7/18/2017) for Annual Wellness visit. Upcoming Health Maintenance Date Due Hepatitis C Screening 1955 DTaP/Tdap/Td series (1 - Tdap) 6/24/1974 PAP AKA CERVICAL CYTOLOGY 6/24/1974 ZOSTER VACCINE AGE 60> 6/24/2013 FOBT Q 1 YEAR AGE 50-75 11/19/2016 INFLUENZA AGE 9 TO ADULT 8/1/2017 BREAST CANCER SCRN MAMMOGRAM 12/29/2017 Allergies as of 6/20/2017  Review Complete On: 6/20/2017 By: Eriberto Jimenez No Known Allergies Current Immunizations  Never Reviewed No immunizations on file. Not reviewed this visit You Were Diagnosed With   
  
 Codes Comments Viral gastroenteritis    -  Primary ICD-10-CM: A08.4 ICD-9-CM: 008.8 Post-nasal drainage     ICD-10-CM: R09.82 ICD-9-CM: 473.9 Acute left-sided low back pain with left-sided sciatica     ICD-10-CM: M54.42 
ICD-9-CM: 724.2, 724.3 Essential hypertension     ICD-10-CM: I10 
ICD-9-CM: 401.9 Fibromyalgia     ICD-10-CM: M79.7 ICD-9-CM: 729.1 Essential hypertension with goal blood pressure less than 130/80     ICD-10-CM: I10 
ICD-9-CM: 401.9 Vitals BP Pulse Temp Resp Height(growth percentile) Weight(growth percentile) 125/84 (BP 1 Location: Right arm, BP Patient Position: Sitting) 73 97 °F (36.1 °C) (Oral) 20 5' 10\" (1.778 m) 243 lb (110.2 kg) SpO2 BMI OB Status Smoking Status 97% 34.87 kg/m2 Postmenopausal Former Smoker Vitals History BMI and BSA Data Body Mass Index Body Surface Area 34.87 kg/m 2 2.33 m 2 Preferred Pharmacy Pharmacy Name Phone [place] [id number] [facility] [address] [place] [address] [phone] Your Updated Medication List  
  
   
 This list is accurate as of: 6/20/17 11:28 AM.  Always use your most recent med list.  
  
  
  
  
 albuterol 90 mcg/actuation inhaler Commonly known as:  PROVENTIL HFA, VENTOLIN HFA, PROAIR HFA Take 2 Puffs by inhalation every six (6) hours as needed for Wheezing. amLODIPine 10 mg tablet Commonly known as:  Claudell Larissavolodymyrbach Take 1 tablet by mouth  daily  
  
 butalbital-acetaminophen-caffeine -40 mg per tablet Commonly known as:  FIORICET, ESGIC  
take 1 tablet by mouth if needed --NOT MORE THAN 2 TABLETS IN 24 HOURS AND NO MORE THAN 2 DAYS A WEEK  
  
 cyclobenzaprine 10 mg tablet Commonly known as:  FLEXERIL Take 1 Tab by mouth three (3) times daily (with meals). diclofenac EC 75 mg EC tablet Commonly known as:  VOLTAREN Take 1 Tab by mouth two (2) times a day. diphenoxylate-atropine 2.5-0.025 mg per tablet Commonly known as:  LOMOTIL Take 1 Tab by mouth four (4) times daily as needed for Diarrhea. Max Daily Amount: 4 Tabs. fexofenadine 60 mg tablet Commonly known as:  Reji Ana Take 1 Tab by mouth two (2) times a day. For allergy  
  
 fluticasone 50 mcg/actuation nasal spray Commonly known as:  Anjali Obi Use 2 sprays in each  nostril daily  
  
 gabapentin 300 mg capsule Commonly known as:  NEURONTIN Take 1 Cap by mouth three (3) times daily. hydrOXYzine pamoate 25 mg capsule Commonly known as:  VISTARIL Take 1 Cap by mouth daily as needed for Itching. May repeat once daily  
  
 ibuprofen 600 mg tablet Commonly known as:  MOTRIN Take 1 Tab by mouth every six (6) hours as needed for Pain.  
  
 loratadine 10 mg Cap Take  by mouth.  
  
 magnesium oxide 400 mg tablet Commonly known as:  MAG-OX Take 1 Tab by mouth daily. metoprolol succinate 25 mg XL tablet Commonly known as:  TOPROL-XL Take 1 Tab by mouth daily. tiZANidine 4 mg tablet Commonly known as:  Cinthia Hides Take 1 Tab by mouth as needed. topiramate 100 mg tablet Commonly known as:  TOPAMAX Take 1 tablet by mouth  daily  
  
 traMADol 50 mg tablet Commonly known as:  ULTRAM  
take 1 tablet by mouth every 6 hours if needed for pain --MAX OF 4 TABS DAILY Prescriptions Printed Refills diphenoxylate-atropine (LOMOTIL) 2.5-0.025 mg per tablet 0 Sig: Take 1 Tab by mouth four (4) times daily as needed for Diarrhea. Max Daily Amount: 4 Tabs. Class: Print Route: Oral  
  
Prescriptions Sent to Pharmacy Refills  
 tiZANidine (ZANAFLEX) 4 mg tablet 1 Sig: Take 1 Tab by mouth as needed. Class: Normal  
 Pharmacy: [facility], [address] [place] [address] Ph #: [phone] Route: Oral  
 metoprolol succinate (TOPROL-XL) 25 mg XL tablet 1 Sig: Take 1 Tab by mouth daily. Class: Normal  
 Pharmacy: [address] [place] [address] Ph #: [phone] Route: Oral  
 gabapentin (NEURONTIN) 300 mg capsule 1 Sig: Take 1 Cap by mouth three (3) times daily. Class: Normal  
 Pharmacy: [address] [place] [address] Ph #: [phone] Route: Oral  
 topiramate (TOPAMAX) 100 mg tablet 1 Sig: Take 1 tablet by mouth  daily Class: Normal  
 Pharmacy: [address] [place] [address] Ph #: [phone]  
 amLODIPine (NORVASC) 10 mg tablet 1 Sig: Take 1 tablet by mouth  daily Class: Normal  
 Pharmacy: [address] [place] [address] Ph #: [phone]  
 hydrOXYzine pamoate (VISTARIL) 25 mg capsule 1 Sig: Take 1 Cap by mouth daily as needed for Itching. May repeat once daily Class: Normal  
 Pharmacy: [address] [place] [address] Ph #: [phone] Route: Oral  
  
We Performed the Following REFERRAL TO PAIN MANAGEMENT [FXF514 Custom] Comments: Please evaluate patient for left low back pain with sciatica. Follow-up Instructions Return in about 4 weeks (around 7/18/2017) for Annual Wellness visit. Referral Information Referral ID Referred By Referred To  
  
 [id number] Shabbir [facility] [address] [facility] Visits Status Start Date End Date 1 New Request 6/20/17 6/20/18 If your referral has a status of pending review or denied, additional information will be sent to support the outcome of this decision. Patient Instructions Gastroenteritis: Care Instructions Your Care Instructions Gastroenteritis is an illness that may cause nausea, vomiting, and diarrhea. It is sometimes called \"stomach flu. \" It can be caused by bacteria or a virus. You will probably begin to feel better in 1 to 2 days. In the meantime, get plenty of rest and make sure you do not become dehydrated. Dehydration occurs when your body loses too much fluid. Follow-up care is a key part of your treatment and safety. Be sure to make and go to all appointments, and call your doctor if you are having problems. Its also a good idea to know your test results and keep a list of the medicines you take. How can you care for yourself at home? · If your doctor prescribed antibiotics, take them as directed. Do not stop taking them just because you feel better. You need to take the full course of antibiotics. · Drink plenty of fluids to prevent dehydration, enough so that your urine is light yellow or clear like water. Choose water and other caffeine-free clear liquids until you feel better. If you have kidney, heart, or liver disease and have to limit fluids, talk with your doctor before you increase your fluid intake. · Drink fluids slowly, in frequent, small amounts, because drinking too much too fast can cause vomiting. · Begin eating mild foods, such as dry toast, yogurt, applesauce, bananas, and rice. Avoid spicy, hot, or high-fat foods, and do not drink alcohol or caffeine for a day or two. Do not drink milk or eat ice cream until you are feeling better. How to prevent gastroenteritis · Keep hot foods hot and cold foods cold. · Do not eat meats, dressings, salads, or other foods that have been kept at room temperature for more than 2 hours. · Use a thermometer to check your refrigerator. It should be between 34°F and 40°F. 
· Defrost meats in the refrigerator or microwave, not on the kitchen counter. · Keep your hands and your kitchen clean. Wash your hands, cutting boards, and countertops with hot soapy water frequently. · Cook meat until it is well done. · Do not eat raw eggs or uncooked sauces made with raw eggs. · Do not take chances. If food looks or tastes spoiled, throw it out. When should you call for help? Call 911 anytime you think you may need emergency care. For example, call if: 
· You vomit blood or what looks like coffee grounds. · You passed out (lost consciousness). · You pass maroon or very bloody stools. Call your doctor now or seek immediate medical care if: 
· You have severe belly pain. · You have signs of needing more fluids. You have sunken eyes, a dry mouth, and pass only a little dark urine. · You feel like you are going to faint. · You have increased belly pain that does not go away in 1 to 2 days. · You have new or increased nausea, or you are vomiting. · You have a new or higher fever. · Your stools are black and tarlike or have streaks of blood. Watch closely for changes in your health, and be sure to contact your doctor if: 
· You are dizzy or lightheaded. · You urinate less than usual, or your urine is dark yellow or brown. · You do not feel better with each day that goes by. Where can you learn more? Go to http://jocelyn-sisi.info/. Enter N142 in the search box to learn more about \"Gastroenteritis: Care Instructions. \" Current as of: March 3, 2017 Content Version: 11.3 © 7540-5760 [employer], [employer]. Care instructions adapted under license by [employer] (which disclaims liability or warranty for this information). If you have questions about a medical condition or this instruction, always ask your healthcare professional. [address] any warranty or liability for your use of this information. Introducing [facility]! Ruth Goins introduces FFFavs patient portal. Now you can access parts of your medical record, email your doctor's office, and request medication refills online. 1. In your internet browser, go to [email or web address] 2. Click on the First Time User? Click Here link in the Sign In box. You will see the New Member Sign Up page. 3. Enter your FFFavs Access Code exactly as it appears below. You will not need to use this code after youve completed the sign-up process. If you do not sign up before the expiration date, you must request a new code. · FFFavs Access Code: [email or web address] Expires: 9/18/2017 11:28 AM 
 
4. Enter the last four digits of your Social Security Number (xxxx) and Date of Birth (mm/dd/yyyy) as indicated and click Submit. You will be taken to the next sign-up page. 5. Create a FFFavs ID. This will be your FFFavs login ID and cannot be changed, so think of one that is secure and easy to remember. 6. Create a FFFavs password. You can change your password at any time. 7. Enter your Password Reset Question and Answer. This can be used at a later time if you forget your password. 8. Enter your e-mail address. You will receive e-mail notification when new information is available in [address] 9. Click Sign Up. You can now view and download portions of your medical record. 10. Click the Download Summary menu link to download a portable copy of your medical information. If you have questions, please visit the Frequently Asked Questions section of the [employer] website. Remember, [employer] is NOT to be used for urgent needs. For medical emergencies, dial 911. Now available from your iPhone and Android! Please provide this summary of care documentation to your next provider. Your primary care clinician is listed as Natalia Prescott. If you have any questions after today's visit, please call [phone].
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
## 2017-06-21 ENCOUNTER — TELEPHONE (OUTPATIENT)
Dept: FAMILY MEDICINE CLINIC | Age: 62
End: 2017-06-21

## 2017-06-23 ENCOUNTER — TELEPHONE (OUTPATIENT)
Dept: FAMILY MEDICINE CLINIC | Age: 62
End: 2017-06-23

## 2017-06-23 RX ORDER — ONDANSETRON 4 MG/1
4 TABLET, ORALLY DISINTEGRATING ORAL
Qty: 15 TAB | Refills: 0 | Status: SHIPPED | OUTPATIENT
Start: 2017-06-23 | End: 2018-03-20 | Stop reason: SDUPTHER

## 2017-07-03 RX ORDER — BUTALBITAL, ACETAMINOPHEN AND CAFFEINE 50; 325; 40 MG/1; MG/1; MG/1
TABLET ORAL
Qty: 30 TAB | Refills: 0 | Status: SHIPPED | OUTPATIENT
Start: 2017-07-03 | End: 2018-03-02 | Stop reason: SDUPTHER

## 2017-07-17 ENCOUNTER — TELEPHONE (OUTPATIENT)
Dept: FAMILY MEDICINE CLINIC | Age: 62
End: 2017-07-17

## 2017-07-17 DIAGNOSIS — R19.7 DIARRHEA, UNSPECIFIED TYPE: Primary | ICD-10-CM

## 2017-07-17 NOTE — TELEPHONE ENCOUNTER
The patient states that her most recent lab work shows elevated levels of her bilirubin; the patient states that she has digestive issues 24 hours per day; the patient states that elevated bilirubin shows issues with the gallbladder and wanted to know if Timmyviji Kezia wants to run some tests on her gallbladder; the patient states that when she throws up it leaves a bitter taste in her mouth; the best contact number for the patient is [phone]; thank you    The patient's blood work should be in the system; Dr. Sheryl Martinez ordered the blood work; thank you

## 2017-07-18 ENCOUNTER — TELEPHONE (OUTPATIENT)
Dept: FAMILY MEDICINE CLINIC | Age: 62
End: 2017-07-18

## 2017-07-18 NOTE — TELEPHONE ENCOUNTER
Dr. Lindsey Nurse, pain management specialist in [place], needs medical records from our office before they will schedule an appointment; the best contact number for Dr. Grace Noble is [phone]; the fax number is [phone]; the patient is requesting we fax these ASAP so that she can be scheduled ASAP; thank you

## 2017-07-18 NOTE — TELEPHONE ENCOUNTER
Recommend consult with GI- patient has had ongoing issues with diarrhea and nausea since 2015. Referral entered to Dr. Abdifatah Luis.

## 2017-07-18 NOTE — TELEPHONE ENCOUNTER
Spoke with patient. Advised that her bilirubin was normal both times done here. She states that she vomits and has a funny taste in her mouth. Advised per Mariusz, see GI again for eval and treatment. Gave info for dr seen in 2015. Patient verbalized understanding and had no questions at this time.

## 2017-09-07 ENCOUNTER — TELEPHONE (OUTPATIENT)
Dept: FAMILY MEDICINE CLINIC | Age: 62
End: 2017-09-07

## 2017-09-07 DIAGNOSIS — M25.512 CHRONIC PAIN OF BOTH SHOULDERS: ICD-10-CM

## 2017-09-07 DIAGNOSIS — G89.29 CHRONIC BILATERAL LOW BACK PAIN, WITH SCIATICA PRESENCE UNSPECIFIED: Primary | ICD-10-CM

## 2017-09-07 DIAGNOSIS — G89.29 CHRONIC PAIN OF BOTH SHOULDERS: ICD-10-CM

## 2017-09-07 DIAGNOSIS — M54.5 CHRONIC BILATERAL LOW BACK PAIN, WITH SCIATICA PRESENCE UNSPECIFIED: Primary | ICD-10-CM

## 2017-09-07 DIAGNOSIS — M54.42 ACUTE LEFT-SIDED LOW BACK PAIN WITH LEFT-SIDED SCIATICA: ICD-10-CM

## 2017-09-07 DIAGNOSIS — M25.511 CHRONIC PAIN OF BOTH SHOULDERS: ICD-10-CM

## 2017-09-07 RX ORDER — TIZANIDINE 4 MG/1
4 TABLET ORAL AS NEEDED
Qty: 30 TAB | Refills: 1 | Status: SHIPPED | OUTPATIENT
Start: 2017-09-07 | End: 2017-09-08 | Stop reason: DRUGHIGH

## 2017-09-07 NOTE — TELEPHONE ENCOUNTER
Pharmacy needs clarification on directions for the following:    Tizanidine (ZANAFLEX) 4mg tablet    Take 1 Tab by mouth as needed. - needs to know how many per day

## 2017-09-07 NOTE — TELEPHONE ENCOUNTER
Patient needs new  Pain management referral for back and shoulders.  She will be seeing the following:    Jaleesa Herman MD  B#[id number]  [facility]  [address] [place] [address]  Phone: [phone]   Fax: [phone]

## 2017-09-08 RX ORDER — TIZANIDINE 4 MG/1
4 TABLET ORAL
Qty: 45 TAB | Refills: 1 | Status: SHIPPED | OUTPATIENT
Start: 2017-09-08 | End: 2017-10-06 | Stop reason: ALTCHOICE

## 2017-09-29 ENCOUNTER — TELEPHONE (OUTPATIENT)
Dept: NEUROLOGY | Age: 62
End: 2017-09-29

## 2017-09-29 NOTE — TELEPHONE ENCOUNTER
----- Message from Ministerio Armstrong sent at 9/29/2017  2:49 PM EDT -----  Regarding: Dr. Jaime Re / telephone  Pt is requesting a refill on her 'fioricet 50/325/40\" to be sent to the [facility] and best contact number  [phone] pt is out of this medication completely.

## 2017-10-06 DIAGNOSIS — M25.512 ACUTE PAIN OF LEFT SHOULDER: ICD-10-CM

## 2017-10-06 RX ORDER — CYCLOBENZAPRINE HCL 10 MG
TABLET ORAL
Qty: 90 TAB | Refills: 6 | Status: SHIPPED | OUTPATIENT
Start: 2017-10-06 | End: 2017-11-24

## 2017-10-31 ENCOUNTER — TELEPHONE (OUTPATIENT)
Dept: FAMILY MEDICINE CLINIC | Age: 62
End: 2017-10-31

## 2017-10-31 NOTE — TELEPHONE ENCOUNTER
Patient called due to medication denied for appointment. Patient says she has not been in to office due to her pain levels and dealing with sciatica. She said as soon as she figures out how she's going to come in she's make an appointment.  Patient would like a call back from nurse in regards to referral for pain management

## 2017-11-01 NOTE — TELEPHONE ENCOUNTER
Advised pt on 10/31/17 that she will need to be seen in office before any referrals will be completed. Pt verbalized understanding but stated she is unable to make it into our office due to increased pain. Also, advised pt that referrals had already been completed for patient previously. Pt verbalized understanding and stated she will call and schedule the appt once she is able to.

## 2017-11-24 ENCOUNTER — OFFICE VISIT (OUTPATIENT)
Dept: FAMILY MEDICINE CLINIC | Age: 62
End: 2017-11-24

## 2017-11-24 VITALS
WEIGHT: 241 LBS | HEIGHT: 70 IN | OXYGEN SATURATION: 98 % | BODY MASS INDEX: 34.5 KG/M2 | SYSTOLIC BLOOD PRESSURE: 129 MMHG | TEMPERATURE: 97.9 F | DIASTOLIC BLOOD PRESSURE: 91 MMHG | RESPIRATION RATE: 19 BRPM | HEART RATE: 83 BPM

## 2017-11-24 DIAGNOSIS — I10 ESSENTIAL HYPERTENSION WITH GOAL BLOOD PRESSURE LESS THAN 130/80: ICD-10-CM

## 2017-11-24 DIAGNOSIS — I10 ESSENTIAL HYPERTENSION: ICD-10-CM

## 2017-11-24 DIAGNOSIS — R13.19 OTHER DYSPHAGIA: Primary | ICD-10-CM

## 2017-11-24 RX ORDER — TOPIRAMATE 100 MG/1
TABLET, FILM COATED ORAL
Qty: 90 TAB | Refills: 1 | Status: SHIPPED | OUTPATIENT
Start: 2017-11-24 | End: 2018-03-02 | Stop reason: DRUGHIGH

## 2017-11-24 RX ORDER — AMLODIPINE BESYLATE 10 MG/1
TABLET ORAL
Qty: 90 TAB | Refills: 1 | Status: SHIPPED | OUTPATIENT
Start: 2017-11-24 | End: 2018-03-02 | Stop reason: SDUPTHER

## 2017-11-24 RX ORDER — METOPROLOL SUCCINATE 25 MG/1
TABLET, EXTENDED RELEASE ORAL
Qty: 90 TAB | Refills: 1 | Status: SHIPPED | OUTPATIENT
Start: 2017-11-24 | End: 2018-03-02 | Stop reason: SDUPTHER

## 2017-11-24 RX ORDER — OMEPRAZOLE 20 MG/1
20 CAPSULE, DELAYED RELEASE ORAL DAILY
COMMUNITY
Start: 2017-10-05 | End: 2018-03-20 | Stop reason: DRUGHIGH

## 2017-11-24 RX ORDER — TIZANIDINE 4 MG/1
4 TABLET ORAL
Qty: 90 TAB | Refills: 1 | Status: SHIPPED | OUTPATIENT
Start: 2017-11-24 | End: 2018-03-02 | Stop reason: SDUPTHER

## 2017-11-24 RX ORDER — HYDROXYZINE PAMOATE 25 MG/1
CAPSULE ORAL
Qty: 90 CAP | Refills: 1 | Status: SHIPPED | OUTPATIENT
Start: 2017-11-24 | End: 2018-03-02 | Stop reason: SDUPTHER

## 2017-11-24 RX ORDER — TIZANIDINE 4 MG/1
4 TABLET ORAL
Refills: 0 | COMMUNITY
Start: 2017-10-06 | End: 2017-11-24 | Stop reason: SDUPTHER

## 2017-11-24 NOTE — PATIENT INSTRUCTIONS

## 2017-11-24 NOTE — MR AVS SNAPSHOT
Visit Information Date & Time Provider Department Dept. Phone Encounter #  
 11/24/2017  9:00 AM Lei Beaver MD [facility] [id number] Follow-up Instructions Return in about 3 months (around 2/24/2018). Upcoming Health Maintenance Date Due Hepatitis C Screening 1955 MEDICARE YEARLY EXAM 6/24/1971 DTaP/Tdap/Td series (1 - Tdap) 6/24/1974 PAP AKA CERVICAL CYTOLOGY 6/24/1974 ZOSTER VACCINE AGE 60> 4/24/2013 FOBT Q 1 YEAR AGE 50-75 11/19/2016 Influenza Age 5 to Adult 8/1/2017 BREAST CANCER SCRN MAMMOGRAM 8/25/2019 Allergies as of 11/24/2017  Review Complete On: 11/24/2017 By: Lei Beaver MD  
 No Known Allergies Current Immunizations  Never Reviewed No immunizations on file. Not reviewed this visit You Were Diagnosed With   
  
 Codes Comments Other dysphagia    -  Primary ICD-10-CM: R13.19 ICD-9-CM: 787.29 Essential hypertension     ICD-10-CM: I10 
ICD-9-CM: 401.9 Essential hypertension with goal blood pressure less than 130/80     ICD-10-CM: I10 
ICD-9-CM: 401.9 Vitals BP Pulse Temp Resp Height(growth percentile) Weight(growth percentile) (!) 129/91 83 97.9 °F (36.6 °C) (Oral) 19 5' 10\" (1.778 m) 241 lb (109.3 kg) SpO2 BMI OB Status Smoking Status 98% 34.58 kg/m2 Postmenopausal Former Smoker Vitals History BMI and BSA Data Body Mass Index Body Surface Area 34.58 kg/m 2 2.32 m 2 Preferred Pharmacy Pharmacy Name Phone [address] [facility] [address] [place] [address] [phone] Your Updated Medication List  
  
   
This list is accurate as of: 11/24/17  9:51 AM.  Always use your most recent med list.  
  
  
  
  
 albuterol 90 mcg/actuation inhaler Commonly known as:  PROVENTIL HFA, VENTOLIN HFA, PROAIR HFA Take 2 Puffs by inhalation every six (6) hours as needed for Wheezing. amLODIPine 10 mg tablet Commonly known as:  Danette Nereida TAKE 1 TABLET EVERY DAY  
  
 butalbital-acetaminophen-caffeine -40 mg per tablet Commonly known as:  FIORICET, ESGIC  
take 1 tablet by mouth if needed --NO MORE THAN 2 TABLETS IN 24 HRS AND NO MORE THAN 2 DAYS A WEEK  
  
 diclofenac EC 75 mg EC tablet Commonly known as:  VOLTAREN Take 1 Tab by mouth two (2) times a day. diphenoxylate-atropine 2.5-0.025 mg per tablet Commonly known as:  LOMOTIL Take 1 Tab by mouth four (4) times daily as needed for Diarrhea. Max Daily Amount: 4 Tabs. fluticasone 50 mcg/actuation nasal spray Commonly known as:  Michaelyn Drought Use 2 sprays in each  nostril daily  
  
 gabapentin 300 mg capsule Commonly known as:  NEURONTIN Take 1 Cap by mouth three (3) times daily. hydrOXYzine pamoate 25 mg capsule Commonly known as:  VISTARIL  
TAKE 1 CAPSULE EVERY DAY AS NEEDED  FOR  ITCHING MAY REPEAT ONE TIME DAILY  
  
 ibuprofen 600 mg tablet Commonly known as:  MOTRIN Take 1 Tab by mouth every six (6) hours as needed for Pain.  
  
 magnesium oxide 400 mg tablet Commonly known as:  MAG-OX Take 1 Tab by mouth daily. metoprolol succinate 25 mg XL tablet Commonly known as:  TOPROL-XL  
TAKE 1 TABLET EVERY DAY  
  
 omeprazole 20 mg capsule Commonly known as:  PRILOSEC Take 20 mg by mouth daily. ondansetron 4 mg disintegrating tablet Commonly known as:  ZOFRAN ODT Take 1 Tab by mouth every eight (8) hours as needed for Nausea. tiZANidine 4 mg tablet Commonly known as:  Roberto Levy Take 1 Tab by mouth nightly. topiramate 100 mg tablet Commonly known as:  TOPAMAX TAKE 1 TABLET EVERY DAY  
  
 traMADol 50 mg tablet Commonly known as:  ULTRAM  
take 1 tablet by mouth every 6 hours if needed for pain --MAX OF 4 TABS DAILY Prescriptions Sent to Pharmacy  Refills  
 metoprolol succinate (TOPROL-XL) 25 mg XL tablet 1  
 Sig: TAKE 1 TABLET EVERY DAY Class: Normal  
 Pharmacy: [address] [place] [address] Ph #: [phone]  
 amLODIPine (NORVASC) 10 mg tablet 1 Sig: TAKE 1 TABLET EVERY DAY Class: Normal  
 Pharmacy: [address] [place] [address] Ph #: [phone]  
 topiramate (TOPAMAX) 100 mg tablet 1 Sig: TAKE 1 TABLET EVERY DAY Class: Normal  
 Pharmacy: [address] [place] [address] Ph #: [phone]  
 hydrOXYzine pamoate (VISTARIL) 25 mg capsule 1 Sig: TAKE 1 CAPSULE EVERY DAY AS NEEDED  FOR  ITCHING MAY REPEAT ONE TIME DAILY Class: Normal  
 Pharmacy: [address] [place] [address] Ph #: [phone]  
 tiZANidine (ZANAFLEX) 4 mg tablet 1 Sig: Take 1 Tab by mouth nightly. Class: Normal  
 Pharmacy: [address] [place] [address] Ph #: [phone] Route: Oral  
  
We Performed the Following METABOLIC PANEL, COMPREHENSIVE [86850 CPT(R)] Follow-up Instructions Return in about 3 months (around 2/24/2018). Patient Instructions DASH Diet: Care Instructions Your Care Instructions The DASH diet is an eating plan that can help lower your blood pressure. DASH stands for Dietary Approaches to Stop Hypertension. Hypertension is high blood pressure. The DASH diet focuses on eating foods that are high in calcium, potassium, and magnesium. These nutrients can lower blood pressure. The foods that are highest in these nutrients are fruits, vegetables, low-fat dairy products, nuts, seeds, and legumes. But taking calcium, potassium, and magnesium supplements instead of eating foods that are high in those nutrients does not have the same effect. The DASH diet also includes whole grains, fish, and poultry.  
The DASH diet is one of several lifestyle changes your doctor may recommend to lower your high blood pressure. Your doctor may also want you to decrease the amount of sodium in your diet. Lowering sodium while following the DASH diet can lower blood pressure even further than just the DASH diet alone. Follow-up care is a key part of your treatment and safety. Be sure to make and go to all appointments, and call your doctor if you are having problems. It's also a good idea to know your test results and keep a list of the medicines you take. How can you care for yourself at home? Following the DASH diet · Eat 4 to 5 servings of fruit each day. A serving is 1 medium-sized piece of fruit, ½ cup chopped or canned fruit, 1/4 cup dried fruit, or 4 ounces (½ cup) of fruit juice. Choose fruit more often than fruit juice. · Eat 4 to 5 servings of vegetables each day. A serving is 1 cup of lettuce or raw leafy vegetables, ½ cup of chopped or cooked vegetables, or 4 ounces (½ cup) of vegetable juice. Choose vegetables more often than vegetable juice. · Get 2 to 3 servings of low-fat and fat-free dairy each day. A serving is 8 ounces of milk, 1 cup of yogurt, or 1 ½ ounces of cheese. · Eat 6 to 8 servings of grains each day. A serving is 1 slice of bread, 1 ounce of dry cereal, or ½ cup of cooked rice, pasta, or cooked cereal. Try to choose whole-grain products as much as possible. · Limit lean meat, poultry, and fish to 2 servings each day. A serving is 3 ounces, about the size of a deck of cards. · Eat 4 to 5 servings of nuts, seeds, and legumes (cooked dried beans, lentils, and split peas) each week. A serving is 1/3 cup of nuts, 2 tablespoons of seeds, or ½ cup of cooked beans or peas. · Limit fats and oils to 2 to 3 servings each day. A serving is 1 teaspoon of vegetable oil or 2 tablespoons of salad dressing. · Limit sweets and added sugars to 5 servings or less a week. A serving is 1 tablespoon jelly or jam, ½ cup sorbet, or 1 cup of lemonade. · Eat less than 2,300 milligrams (mg) of sodium a day. If you limit your sodium to 1,500 mg a day, you can lower your blood pressure even more. Tips for success · Start small. Do not try to make dramatic changes to your diet all at once. You might feel that you are missing out on your favorite foods and then be more likely to not follow the plan. Make small changes, and stick with them. Once those changes become habit, add a few more changes. · Try some of the following: ¨ Make it a goal to eat a fruit or vegetable at every meal and at snacks. This will make it easy to get the recommended amount of fruits and vegetables each day. ¨ Try yogurt topped with fruit and nuts for a snack or healthy dessert. ¨ Add lettuce, tomato, cucumber, and onion to sandwiches. ¨ Combine a ready-made pizza crust with low-fat mozzarella cheese and lots of vegetable toppings. Try using tomatoes, squash, spinach, broccoli, carrots, cauliflower, and onions. ¨ Have a variety of cut-up vegetables with a low-fat dip as an appetizer instead of chips and dip. ¨ Sprinkle sunflower seeds or chopped almonds over salads. Or try adding chopped walnuts or almonds to cooked vegetables. ¨ Try some vegetarian meals using beans and peas. Add garbanzo or kidney beans to salads. Make burritos and tacos with mashed tejada beans or black beans. Where can you learn more? Go to http://jocelyn-sisi.info/. Enter H116 in the search box to learn more about \"DASH Diet: Care Instructions. \" Current as of: September 21, 2016 Content Version: 11.4 © 3645-3879 [employer]. Care instructions adapted under license by [employer] (which disclaims liability or warranty for this information). If you have questions about a medical condition or this instruction, always ask your healthcare professional. [address] any warranty or liability for your use of this information. Introducing [facility]! Dear Elan Promise: Thank you for requesting a [employer] account. Our records indicate that you already have an active Brevado account. You can access your account anytime at [email or web address] Did you know that you can access your hospital and ER discharge instructions at any time in Brevado? You can also review all of your test results from your hospital stay or ER visit. Additional Information If you have questions, please visit the Frequently Asked Questions section of the [employer]ado website at [email or web address] Remember, Brevado is NOT to be used for urgent needs. For medical emergencies, dial 911. Now available from your iPhone and Android! Please provide this summary of care documentation to your next provider. Your primary care clinician is listed as Estelle Ocampo. If you have any questions after today's visit, please call [phone].

## 2017-11-24 NOTE — PROGRESS NOTES
1. Have you been to the ER, urgent care clinic since your last visit? Hospitalized since your last visit? No    2. Have you seen or consulted any other health care providers outside of the [address] [place] [address] since your last visit? Include any pap smears or colon screening.  No     Chief Complaint   Patient presents with    Medication Refill    Referral Request

## 2017-11-24 NOTE — PROGRESS NOTES
Chief Complaint   Patient presents with    Medication Refill    Referral Request     she is a 57y.o. year old female who presents for evalution. She has a lumbar radiculopathy. She was referred to ortho surgery  She also c/o pain in the chest when she eats anything  She has a h/o ulceration in the esophagus in the past  She boyle seen GI for colonoscopy but not for this complaint  She needs a refill on meds  She took her bp meds today    Reviewed PmHx, RxHx, FmHx, SocHx, AllgHx and updated and dated in the chart. Aspirin yes ____   No____ N/A____    Patient Active Problem List    Diagnosis    Allergic bronchitis    Discomfort    Seasonal allergic rhinitis due to pollen    Essential hypertension    Other allergic rhinitis       Nurse notes were reviewed and copied and are correct  Review of Systems - negative except as listed above in the HPI    Objective:     Vitals:    11/24/17 0919   BP: (!) 129/91   Pulse: 83   Resp: 19   Temp: 97.9 °F (36.6 °C)   TempSrc: Oral   SpO2: 98%   Weight: 241 lb (109.3 kg)   Height: 5' 10\" (1.778 m)       Physical Examination: General appearance - alert, well appearing, and in no distress  Mental status - alert, oriented to person, place, and time  Lymphatics - no palpable lymphadenopathy, no hepatosplenomegaly  Chest - clear to auscultation, no wheezes, rales or rhonchi, symmetric air entry  Heart - normal rate, regular rhythm, normal S1, S2, no murmurs, rubs, clicks or gallops  Musculoskeletal - no joint tenderness, deformity or swelling  Extremities - peripheral pulses normal, no pedal edema, no clubbing or cyanosis  Skin - normal coloration and turgor, no rashes, no suspicious skin lesions noted      Assessment/ Plan:   Diagnoses and all orders for this visit:    1. Other dysphagia  Make appt for thw GI doc    2.  Essential hypertension with goal blood pressure less than 130/80  -     metoprolol succinate (TOPROL-XL) 25 mg XL tablet; TAKE 1 TABLET EVERY DAY  - amLODIPine (NORVASC) 10 mg tablet; TAKE 1 TABLET EVERY DAY    Other orders  -     topiramate (TOPAMAX) 100 mg tablet; TAKE 1 TABLET EVERY DAY  -     hydrOXYzine pamoate (VISTARIL) 25 mg capsule; TAKE 1 CAPSULE EVERY DAY AS NEEDED  FOR  ITCHING MAY REPEAT ONE TIME DAILY  -     tiZANidine (ZANAFLEX) 4 mg tablet; Take 1 Tab by mouth nightly. Follow-up Disposition:  Return in about 3 months (around 2/24/2018). ICD-10-CM ICD-9-CM    1. Other dysphagia R13.19 787.29    2. Essential hypertension I10 401.9    3. Essential hypertension with goal blood pressure less than 130/80 I10 401.9 metoprolol succinate (TOPROL-XL) 25 mg XL tablet      amLODIPine (NORVASC) 10 mg tablet      METABOLIC PANEL, COMPREHENSIVE       I have discussed the diagnosis with the patient and the intended plan as seen in the above orders. The patient has received an after-visit summary and questions were answered concerning future plans. Medication Side Effects and Warnings were discussed with patient: yes  Patient Labs were reviewed and or requested: yes  Patient Past Records were reviewed and or requested: yes        Patient Instructions        DASH Diet: Care Instructions  Your Care Instructions    The DASH diet is an eating plan that can help lower your blood pressure. DASH stands for Dietary Approaches to Stop Hypertension. Hypertension is high blood pressure. The DASH diet focuses on eating foods that are high in calcium, potassium, and magnesium. These nutrients can lower blood pressure. The foods that are highest in these nutrients are fruits, vegetables, low-fat dairy products, nuts, seeds, and legumes. But taking calcium, potassium, and magnesium supplements instead of eating foods that are high in those nutrients does not have the same effect. The DASH diet also includes whole grains, fish, and poultry. The DASH diet is one of several lifestyle changes your doctor may recommend to lower your high blood pressure.  Your doctor may also want you to decrease the amount of sodium in your diet. Lowering sodium while following the DASH diet can lower blood pressure even further than just the DASH diet alone. Follow-up care is a key part of your treatment and safety. Be sure to make and go to all appointments, and call your doctor if you are having problems. It's also a good idea to know your test results and keep a list of the medicines you take. How can you care for yourself at home? Following the DASH diet  · Eat 4 to 5 servings of fruit each day. A serving is 1 medium-sized piece of fruit, ½ cup chopped or canned fruit, 1/4 cup dried fruit, or 4 ounces (½ cup) of fruit juice. Choose fruit more often than fruit juice. · Eat 4 to 5 servings of vegetables each day. A serving is 1 cup of lettuce or raw leafy vegetables, ½ cup of chopped or cooked vegetables, or 4 ounces (½ cup) of vegetable juice. Choose vegetables more often than vegetable juice. · Get 2 to 3 servings of low-fat and fat-free dairy each day. A serving is 8 ounces of milk, 1 cup of yogurt, or 1 ½ ounces of cheese. · Eat 6 to 8 servings of grains each day. A serving is 1 slice of bread, 1 ounce of dry cereal, or ½ cup of cooked rice, pasta, or cooked cereal. Try to choose whole-grain products as much as possible. · Limit lean meat, poultry, and fish to 2 servings each day. A serving is 3 ounces, about the size of a deck of cards. · Eat 4 to 5 servings of nuts, seeds, and legumes (cooked dried beans, lentils, and split peas) each week. A serving is 1/3 cup of nuts, 2 tablespoons of seeds, or ½ cup of cooked beans or peas. · Limit fats and oils to 2 to 3 servings each day. A serving is 1 teaspoon of vegetable oil or 2 tablespoons of salad dressing. · Limit sweets and added sugars to 5 servings or less a week. A serving is 1 tablespoon jelly or jam, ½ cup sorbet, or 1 cup of lemonade. · Eat less than 2,300 milligrams (mg) of sodium a day.  If you limit your sodium to 1,500 mg a day, you can lower your blood pressure even more. Tips for success  · Start small. Do not try to make dramatic changes to your diet all at once. You might feel that you are missing out on your favorite foods and then be more likely to not follow the plan. Make small changes, and stick with them. Once those changes become habit, add a few more changes. · Try some of the following:  ¨ Make it a goal to eat a fruit or vegetable at every meal and at snacks. This will make it easy to get the recommended amount of fruits and vegetables each day. ¨ Try yogurt topped with fruit and nuts for a snack or healthy dessert. ¨ Add lettuce, tomato, cucumber, and onion to sandwiches. ¨ Combine a ready-made pizza crust with low-fat mozzarella cheese and lots of vegetable toppings. Try using tomatoes, squash, spinach, broccoli, carrots, cauliflower, and onions. ¨ Have a variety of cut-up vegetables with a low-fat dip as an appetizer instead of chips and dip. ¨ Sprinkle sunflower seeds or chopped almonds over salads. Or try adding chopped walnuts or almonds to cooked vegetables. ¨ Try some vegetarian meals using beans and peas. Add garbanzo or kidney beans to salads. Make burritos and tacos with mashed tejada beans or black beans. Where can you learn more? Go to http://jocelyn-sisi.info/. Enter T615 in the search box to learn more about \"DASH Diet: Care Instructions. \"  Current as of: September 21, 2016  Content Version: 11.4  © 0314-3508 [employer]. Care instructions adapted under license by [employer] (which disclaims liability or warranty for this information). If you have questions about a medical condition or this instruction, always ask your healthcare professional. [address] any warranty or liability for your use of this information.         The patient verbalizes understanding and agrees with the plan of care        Patient has the advanced directives booklet to review

## 2017-11-25 LAB
ALBUMIN SERPL-MCNC: 4.5 G/DL (ref 3.6–4.8)
ALBUMIN/GLOB SERPL: 1.6 {RATIO} (ref 1.2–2.2)
ALP SERPL-CCNC: 85 IU/L (ref 39–117)
ALT SERPL-CCNC: 18 IU/L (ref 0–32)
AST SERPL-CCNC: 17 IU/L (ref 0–40)
BILIRUB SERPL-MCNC: 0.3 MG/DL (ref 0–1.2)
BUN SERPL-MCNC: 21 MG/DL (ref 8–27)
BUN/CREAT SERPL: 16 (ref 12–28)
CALCIUM SERPL-MCNC: 10 MG/DL (ref 8.7–10.3)
CHLORIDE SERPL-SCNC: 105 MMOL/L (ref 96–106)
CO2 SERPL-SCNC: 23 MMOL/L (ref 18–29)
CREAT SERPL-MCNC: 1.33 MG/DL (ref 0.57–1)
GFR SERPLBLD CREATININE-BSD FMLA CKD-EPI: 42 ML/MIN/1.73
GFR SERPLBLD CREATININE-BSD FMLA CKD-EPI: 49 ML/MIN/1.73
GLOBULIN SER CALC-MCNC: 2.9 G/DL (ref 1.5–4.5)
GLUCOSE SERPL-MCNC: 108 MG/DL (ref 65–99)
INTERPRETATION: NORMAL
POTASSIUM SERPL-SCNC: 3.6 MMOL/L (ref 3.5–5.2)
PROT SERPL-MCNC: 7.4 G/DL (ref 6–8.5)
SODIUM SERPL-SCNC: 144 MMOL/L (ref 134–144)

## 2017-11-27 NOTE — PROGRESS NOTES
The kidney test is more abnormal now than it was last year. This needs to be tested a lot more often than once a year. I want to recheck in 3 months.  If it is any worse I want to refer you to a specialist  Right now blood pressure control is very important  Avoid salty foods, avoid junk food and try to move more each day

## 2017-11-28 ENCOUNTER — TELEPHONE (OUTPATIENT)
Dept: FAMILY MEDICINE CLINIC | Age: 62
End: 2017-11-28

## 2017-12-01 DIAGNOSIS — M79.7 FIBROMYALGIA: ICD-10-CM

## 2017-12-01 NOTE — TELEPHONE ENCOUNTER
Pharmacy sent over refill for Gabapentin 100 mg but we have pt taking 300 mg and they are requesting a 90 day supply.

## 2017-12-02 RX ORDER — GABAPENTIN 300 MG/1
300 CAPSULE ORAL 3 TIMES DAILY
Qty: 270 CAP | Refills: 0 | Status: SHIPPED | OUTPATIENT
Start: 2017-12-02 | End: 2018-03-02 | Stop reason: SDUPTHER

## 2017-12-08 ENCOUNTER — TELEPHONE (OUTPATIENT)
Dept: FAMILY MEDICINE CLINIC | Age: 62
End: 2017-12-08

## 2017-12-11 DIAGNOSIS — M54.42 ACUTE BACK PAIN WITH SCIATICA, LEFT: Primary | ICD-10-CM

## 2018-03-02 ENCOUNTER — OFFICE VISIT (OUTPATIENT)
Dept: FAMILY MEDICINE CLINIC | Age: 63
End: 2018-03-02

## 2018-03-02 VITALS
OXYGEN SATURATION: 99 % | WEIGHT: 228.5 LBS | DIASTOLIC BLOOD PRESSURE: 92 MMHG | RESPIRATION RATE: 18 BRPM | TEMPERATURE: 97.7 F | HEART RATE: 74 BPM | BODY MASS INDEX: 32.71 KG/M2 | HEIGHT: 70 IN | SYSTOLIC BLOOD PRESSURE: 135 MMHG

## 2018-03-02 DIAGNOSIS — M54.42 CHRONIC BILATERAL LOW BACK PAIN WITH BILATERAL SCIATICA: ICD-10-CM

## 2018-03-02 DIAGNOSIS — R51.9 DAILY HEADACHE: ICD-10-CM

## 2018-03-02 DIAGNOSIS — G89.29 CHRONIC BILATERAL LOW BACK PAIN WITH BILATERAL SCIATICA: ICD-10-CM

## 2018-03-02 DIAGNOSIS — M79.7 FIBROMYALGIA: ICD-10-CM

## 2018-03-02 DIAGNOSIS — M25.552 LEFT HIP PAIN: ICD-10-CM

## 2018-03-02 DIAGNOSIS — M54.41 CHRONIC BILATERAL LOW BACK PAIN WITH BILATERAL SCIATICA: ICD-10-CM

## 2018-03-02 DIAGNOSIS — I49.9 CARDIAC ARRHYTHMIA, UNSPECIFIED CARDIAC ARRHYTHMIA TYPE: ICD-10-CM

## 2018-03-02 DIAGNOSIS — I10 ESSENTIAL HYPERTENSION WITH GOAL BLOOD PRESSURE LESS THAN 130/80: Primary | ICD-10-CM

## 2018-03-02 RX ORDER — METOPROLOL SUCCINATE 25 MG/1
TABLET, EXTENDED RELEASE ORAL
Qty: 90 TAB | Refills: 1 | Status: SHIPPED | OUTPATIENT
Start: 2018-03-02 | End: 2018-09-24 | Stop reason: SDUPTHER

## 2018-03-02 RX ORDER — TIZANIDINE 4 MG/1
4 TABLET ORAL
Qty: 90 TAB | Refills: 1 | Status: SHIPPED | OUTPATIENT
Start: 2018-03-02 | End: 2018-07-06 | Stop reason: SDUPTHER

## 2018-03-02 RX ORDER — TOPIRAMATE 50 MG/1
50 TABLET, FILM COATED ORAL 2 TIMES DAILY
Qty: 180 TAB | Refills: 1 | Status: SHIPPED | OUTPATIENT
Start: 2018-03-02

## 2018-03-02 RX ORDER — AMLODIPINE BESYLATE 10 MG/1
TABLET ORAL
Qty: 90 TAB | Refills: 1 | Status: SHIPPED | OUTPATIENT
Start: 2018-03-02 | End: 2018-07-06 | Stop reason: SDUPTHER

## 2018-03-02 RX ORDER — HYDROXYZINE PAMOATE 25 MG/1
CAPSULE ORAL
Qty: 90 CAP | Refills: 1 | Status: SHIPPED | OUTPATIENT
Start: 2018-03-02 | End: 2018-07-06 | Stop reason: SDUPTHER

## 2018-03-02 RX ORDER — BUTALBITAL, ACETAMINOPHEN AND CAFFEINE 50; 325; 40 MG/1; MG/1; MG/1
1 TABLET ORAL
Qty: 30 TAB | Refills: 0 | Status: SHIPPED | OUTPATIENT
Start: 2018-03-02 | End: 2018-07-06 | Stop reason: SDUPTHER

## 2018-03-02 RX ORDER — GABAPENTIN 300 MG/1
300 CAPSULE ORAL 3 TIMES DAILY
Qty: 270 CAP | Refills: 0 | Status: SHIPPED | OUTPATIENT
Start: 2018-03-02 | End: 2018-07-06 | Stop reason: SDUPTHER

## 2018-03-02 RX ORDER — TOPIRAMATE 50 MG/1
50 TABLET, FILM COATED ORAL 2 TIMES DAILY
Qty: 60 TAB | Refills: 0 | Status: SHIPPED | OUTPATIENT
Start: 2018-03-02 | End: 2018-03-20 | Stop reason: SDUPTHER

## 2018-03-02 NOTE — PROGRESS NOTES
Chief Complaint   Patient presents with    Medication Refill    Leg Pain     left    Headache     Pt here for follow up with the provider     Pt states she has had left leg pain X 1 week pain at its worse pain 6/10    Pt c/o migraine headaches.  Pt states she has a neurologist but he I s expensive

## 2018-03-02 NOTE — PATIENT INSTRUCTIONS
Headache: Care Instructions  Your Care Instructions    Headaches have many possible causes. Most headaches aren't a sign of a more serious problem, and they will get better on their own. Home treatment may help you feel better faster. The doctor has checked you carefully, but problems can develop later. If you notice any problems or new symptoms, get medical treatment right away. Follow-up care is a key part of your treatment and safety. Be sure to make and go to all appointments, and call your doctor if you are having problems. It's also a good idea to know your test results and keep a list of the medicines you take. How can you care for yourself at home? · Do not drive if you have taken a prescription pain medicine. · Rest in a quiet, dark room until your headache is gone. Close your eyes and try to relax or go to sleep. Don't watch TV or read. · Put a cold, moist cloth or cold pack on the painful area for 10 to 20 minutes at a time. Put a thin cloth between the cold pack and your skin. · Use a warm, moist towel or a heating pad set on low to relax tight shoulder and neck muscles. · Have someone gently massage your neck and shoulders. · Take pain medicines exactly as directed. ¨ If the doctor gave you a prescription medicine for pain, take it as prescribed. ¨ If you are not taking a prescription pain medicine, ask your doctor if you can take an over-the-counter medicine. · Be careful not to take pain medicine more often than the instructions allow, because you may get worse or more frequent headaches when the medicine wears off. · Do not ignore new symptoms that occur with a headache, such as a fever, weakness or numbness, vision changes, or confusion. These may be signs of a more serious problem. To prevent headaches  · Keep a headache diary so you can figure out what triggers your headaches. Avoiding triggers may help you prevent headaches.  Record when each headache began, how long it lasted, and what the pain was like (throbbing, aching, stabbing, or dull). Write down any other symptoms you had with the headache, such as nausea, flashing lights or dark spots, or sensitivity to bright light or loud noise. Note if the headache occurred near your period. List anything that might have triggered the headache, such as certain foods (chocolate, cheese, wine) or odors, smoke, bright light, stress, or lack of sleep. · Find healthy ways to deal with stress. Headaches are most common during or right after stressful times. Take time to relax before and after you do something that has caused a headache in the past.  · Try to keep your muscles relaxed by keeping good posture. Check your jaw, face, neck, and shoulder muscles for tension, and try relaxing them. When sitting at a desk, change positions often, and stretch for 30 seconds each hour. · Get plenty of sleep and exercise. · Eat regularly and well. Long periods without food can trigger a headache. · Treat yourself to a massage. Some people find that regular massages are very helpful in relieving tension. · Limit caffeine by not drinking too much coffee, tea, or soda. But don't quit caffeine suddenly, because that can also give you headaches. · Reduce eyestrain from computers by blinking frequently and looking away from the computer screen every so often. Make sure you have proper eyewear and that your monitor is set up properly, about an arm's length away. · Seek help if you have depression or anxiety. Your headaches may be linked to these conditions. Treatment can both prevent headaches and help with symptoms of anxiety or depression. When should you call for help? Call 911 anytime you think you may need emergency care. For example, call if:  ? · You have signs of a stroke. These may include:  ¨ Sudden numbness, paralysis, or weakness in your face, arm, or leg, especially on only one side of your body. ¨ Sudden vision changes.   ¨ Sudden trouble speaking. ¨ Sudden confusion or trouble understanding simple statements. ¨ Sudden problems with walking or balance. ¨ A sudden, severe headache that is different from past headaches. ?Call your doctor now or seek immediate medical care if:  ? · You have a new or worse headache. ? · Your headache gets much worse. Where can you learn more? Go to http://jocelyn-sisi.info/. Enter M271 in the search box to learn more about \"Headache: Care Instructions. \"  Current as of: October 14, 2016  Content Version: 11.4  © 1065-6316 [employer]. Care instructions adapted under license by [employer] (which disclaims liability or warranty for this information). If you have questions about a medical condition or this instruction, always ask your healthcare professional. [address] any warranty or liability for your use of this information. Hip Pain: Care Instructions  Your Care Instructions    Hip pain may be caused by many things, including overuse, a fall, or a twisting movement. Another cause of hip pain is arthritis. Your pain may increase when you stand up, walk, or squat. The pain may come and go or may be constant. Home treatment can help relieve hip pain, swelling, and stiffness. If your pain is ongoing, you may need more tests and treatment. Follow-up care is a key part of your treatment and safety. Be sure to make and go to all appointments, and call your doctor if you are having problems. It's also a good idea to know your test results and keep a list of the medicines you take. How can you care for yourself at home? · Take pain medicines exactly as directed. ¨ If the doctor gave you a prescription medicine for pain, take it as prescribed. ¨ If you are not taking a prescription pain medicine, ask your doctor if you can take an over-the-counter medicine. · Rest and protect your hip.  Take a break from any activity, including standing or walking, that may cause pain. · Put ice or a cold pack against your hip for 10 to 20 minutes at a time. Try to do this every 1 to 2 hours for the next 3 days (when you are awake) or until the swelling goes down. Put a thin cloth between the ice and your skin. · Sleep on your healthy side with a pillow between your knees, or sleep on your back with pillows under your knees. · If there is no swelling, you can put moist heat, a heating pad, or a warm cloth on your hip. Do gentle stretching exercises to help keep your hip flexible. · Learn how to prevent falls. Have your vision and hearing checked regularly. Wear slippers or shoes with a nonskid sole. · Stay at a healthy weight. · Wear comfortable shoes. When should you call for help? Call 911 anytime you think you may need emergency care. For example, call if:  ? · You have sudden chest pain and shortness of breath, or you cough up blood. ? · You are not able to stand or walk or bear weight. ? · Your buttocks, legs, or feet feel numb or tingly. ? · Your leg or foot is cool or pale or changes color. ? · You have severe pain. ?Call your doctor now or seek immediate medical care if:  ? · You have signs of infection, such as:  ¨ Increased pain, swelling, warmth, or redness in the hip area. ¨ Red streaks leading from the hip area. ¨ Pus draining from the hip area. ¨ A fever. ? · You have signs of a blood clot, such as:  ¨ Pain in your calf, back of the knee, thigh, or groin. ¨ Redness and swelling in your leg or groin. ? · You are not able to bend, straighten, or move your leg normally. ? · You have trouble urinating or having bowel movements. ? Watch closely for changes in your health, and be sure to contact your doctor if:  ? · You do not get better as expected. Where can you learn more? Go to http://coreen.info/. Enter Y923 in the search box to learn more about \"Hip Pain: Care Instructions. \"  Current as of: March 20, 2017  Content Version: 11.4  © 5863-8905 Healthwise[employer]orporated. Care instructions adapted under license by [employer] (which disclaims liability or warranty for this information). If you have questions about a medical condition or this instruction, always ask your healthcare professional. [address] any warranty or liability for your use of this information.

## 2018-03-02 NOTE — MR AVS SNAPSHOT
[address] [place] [id number] 
[phone] Patient: Indira Galarza MRN: [id number] HYR:[id number] Visit Information Date & Time Provider Department Dept. Phone Encounter #  
 3/2/2018  2:45 PM Eriberto Sanchez  [facility] [phone] [id number] Follow-up Instructions Return in about 4 weeks (around 3/30/2018). Your Appointments 3/8/2018  8:30 AM  
Any with Eriberto Sanchez NP [id number] [facility] ([address]) Appt Note: f/u from surgeries/ medication refills; f[facility]u [facility] surgeries/ [facility] [address]  
Madhavi [address] Upcoming Health Maintenance Date Due Hepatitis C Screening 1955 MEDICARE YEARLY EXAM 6/24/1971 DTaP/Tdap/Td series (1 - Tdap) 6/24/1974 PAP AKA CERVICAL CYTOLOGY 6/24/1974 ZOSTER VACCINE AGE 60> 4/24/2013 FOBT Q 1 YEAR AGE 50-75 11/19/2016 Influenza Age 5 to Adult 8/1/2017 BREAST CANCER SCRN MAMMOGRAM 8/25/2019 Allergies as of 3/2/2018  Review Complete On: 3/2/2018 By: Jacky العراقي LPN No Known Allergies Current Immunizations  Never Reviewed No immunizations on file. Not reviewed this visit You Were Diagnosed With   
  
 Codes Comments Essential hypertension with goal blood pressure less than 130/80    -  Primary ICD-10-CM: I10 
ICD-9-CM: 401.9 Fibromyalgia     ICD-10-CM: M79.7 ICD-9-CM: 729.1 Daily headache     ICD-10-CM: R51 ICD-9-CM: 115. 0 Chronic bilateral low back pain with bilateral sciatica     ICD-10-CM: M54.42, M54.41, G89.29 ICD-9-CM: 724.2, 724.3, 338.29 Left hip pain     ICD-10-CM: M25.552 ICD-9-CM: 719.45 Cardiac arrhythmia, unspecified cardiac arrhythmia type     ICD-10-CM: I49.9 ICD-9-CM: 427.9 Postoperative wound infection, initial encounter     ICD-10-CM: T81. 4XXA ICD-9-CM: 998.59 Vitals BP Pulse Temp Resp Height(growth percentile) Weight(growth percentile) (!) 135/92 74 97.7 °F (36.5 °C) (Oral) 18 5' 10\" (1.778 m) 228 lb 8 oz (103.6 kg) SpO2 BMI OB Status Smoking Status 99% 32.79 kg/m2 Postmenopausal Former Smoker BMI and BSA Data Body Mass Index Body Surface Area 32.79 kg/m 2 2.26 m 2 Preferred Pharmacy Pharmacy Name Phone [facility] [address] [place] [address] [address] [phone] Your Updated Medication List  
  
   
This list is accurate as of 3/2/18  3:39 PM.  Always use your most recent med list.  
  
  
  
  
 albuterol 90 mcg/actuation inhaler Commonly known as:  PROVENTIL HFA, VENTOLIN HFA, PROAIR HFA Take 2 Puffs by inhalation every six (6) hours as needed for Wheezing. amLODIPine 10 mg tablet Commonly known as:  [place] TAKE 1 TABLET EVERY DAY  
  
 butalbital-acetaminophen-caffeine -40 mg per tablet Commonly known as:  Brenda Muster Take 1 Tab by mouth every six (6) hours as needed for Pain. diclofenac EC 75 mg EC tablet Commonly known as:  VOLTAREN Take 1 Tab by mouth two (2) times a day. diphenoxylate-atropine 2.5-0.025 mg per tablet Commonly known as:  LOMOTIL Take 1 Tab by mouth four (4) times daily as needed for Diarrhea. Max Daily Amount: 4 Tabs. fluticasone 50 mcg/actuation nasal spray Commonly known as:  Marlaine Romance Use 2 sprays in each  nostril daily  
  
 gabapentin 300 mg capsule Commonly known as:  NEURONTIN Take 1 Cap by mouth three (3) times daily. hydrOXYzine pamoate 25 mg capsule Commonly known as:  VISTARIL  
TAKE 1 CAPSULE EVERY DAY AS NEEDED  FOR  ITCHING MAY REPEAT ONE TIME DAILY  
  
 ibuprofen 600 mg tablet Commonly known as:  MOTRIN Take 1 Tab by mouth every six (6) hours as needed for Pain. magnesium oxide 400 mg tablet Commonly known as:  MAG-OX Take 1 Tab by mouth daily. metoprolol succinate 25 mg XL tablet Commonly known as:  TOPROL-XL  
TAKE 1 TABLET EVERY DAY  
  
 omeprazole 20 mg capsule Commonly known as:  PRILOSEC Take 20 mg by mouth daily. ondansetron 4 mg disintegrating tablet Commonly known as:  ZOFRAN ODT Take 1 Tab by mouth every eight (8) hours as needed for Nausea. tiZANidine 4 mg tablet Commonly known as:  Jolinda Puff Take 1 Tab by mouth nightly. * topiramate 50 mg tablet Commonly known as:  TOPAMAX Take 1 Tab by mouth two (2) times a day. * topiramate 50 mg tablet Commonly known as:  TOPAMAX Take 1 Tab by mouth two (2) times a day. traMADol 50 mg tablet Commonly known as:  ULTRAM  
take 1 tablet by mouth every 6 hours if needed for pain --MAX OF 4 TABS DAILY * Notice: This list has 2 medication(s) that are the same as other medications prescribed for you. Read the directions carefully, and ask your doctor or other care provider to review them with you. Prescriptions Printed Refills  
 butalbital-acetaminophen-caffeine (FIORICET, ESGIC) -40 mg per tablet 0 Sig: Take 1 Tab by mouth every six (6) hours as needed for Pain. Class: Print Route: Oral  
 topiramate (TOPAMAX) 50 mg tablet 0 Sig: Take 1 Tab by mouth two (2) times a day. Class: Print Route: Oral  
  
Prescriptions Sent to Pharmacy Refills  
 tiZANidine (ZANAFLEX) 4 mg tablet 1 Sig: Take 1 Tab by mouth nightly. Class: Normal  
 Pharmacy: [address] [place] [address] Ph #: [phone] Route: Oral  
 amLODIPine (NORVASC) 10 mg tablet 1 Sig: TAKE 1 TABLET EVERY DAY Class: Normal  
 Pharmacy: [address] [place] [address] Ph #: [phone]  
 metoprolol succinate (TOPROL-XL) 25 mg XL tablet 1  Sig: TAKE 1 TABLET EVERY DAY  
 Class: Normal  
 Pharmacy: [address] [place] [address] Ph #: [phone]  
 hydrOXYzine pamoate (VISTARIL) 25 mg capsule 1 Sig: TAKE 1 CAPSULE EVERY DAY AS NEEDED  FOR  ITCHING MAY REPEAT ONE TIME DAILY Class: Normal  
 Pharmacy: [address] [place] [address] Ph #: [phone]  
 gabapentin (NEURONTIN) 300 mg capsule 0 Sig: Take 1 Cap by mouth three (3) times daily. Class: Normal  
 Pharmacy: [address] [place] [address] Ph #: [phone] Route: Oral  
 topiramate (TOPAMAX) 50 mg tablet 1 Sig: Take 1 Tab by mouth two (2) times a day. Class: Normal  
 Pharmacy: [address] [place] [address] Ph #: [phone] Route: Oral  
  
We Performed the Following REFERRAL TO CARDIOLOGY [RTX71 Custom] Comments:  
 eval for arrhythmia in hospital, discharged on flecainide Follow-up Instructions Return in about 4 weeks (around 3/30/2018). To-Do List   
 03/02/2018 Imaging:  XR HIP LT W OR WO PELV 2-3 VWS Referral Information Referral ID Referred By Referred To  
  
 [id number] MD [address] [facility], [address] Phone: [phone] Fax: [phone] Visits Status Start Date End Date 1 New Request 3/2/18 3/2/19 If your referral has a status of pending review or denied, additional information will be sent to support the outcome of this decision. Patient Instructions Headache: Care Instructions Your Care Instructions Headaches have many possible causes. Most headaches aren't a sign of a more serious problem, and they will get better on their own. Home treatment may help you feel better faster. The doctor has checked you carefully, but problems can develop later. If you notice any problems or new symptoms, get medical treatment right away. Follow-up care is a key part of your treatment and safety. Be sure to make and go to all appointments, and call your doctor if you are having problems. It's also a good idea to know your test results and keep a list of the medicines you take. How can you care for yourself at home? · Do not drive if you have taken a prescription pain medicine. · Rest in a quiet, dark room until your headache is gone. Close your eyes and try to relax or go to sleep. Don't watch TV or read. · Put a cold, moist cloth or cold pack on the painful area for 10 to 20 minutes at a time. Put a thin cloth between the cold pack and your skin. · Use a warm, moist towel or a heating pad set on low to relax tight shoulder and neck muscles. · Have someone gently massage your neck and shoulders. · Take pain medicines exactly as directed. ¨ If the doctor gave you a prescription medicine for pain, take it as prescribed. ¨ If you are not taking a prescription pain medicine, ask your doctor if you can take an over-the-counter medicine. · Be careful not to take pain medicine more often than the instructions allow, because you may get worse or more frequent headaches when the medicine wears off. · Do not ignore new symptoms that occur with a headache, such as a fever, weakness or numbness, vision changes, or confusion. These may be signs of a more serious problem. To prevent headaches · Keep a headache diary so you can figure out what triggers your headaches. Avoiding triggers may help you prevent headaches. Record when each headache began, how long it lasted, and what the pain was like (throbbing, aching, stabbing, or dull).  Write down any other symptoms you had with the headache, such as nausea, flashing lights or dark spots, or sensitivity to bright light or loud noise. Note if the headache occurred near your period. List anything that might have triggered the headache, such as certain foods (chocolate, cheese, wine) or odors, smoke, bright light, stress, or lack of sleep. · Find healthy ways to deal with stress. Headaches are most common during or right after stressful times. Take time to relax before and after you do something that has caused a headache in the past. 
· Try to keep your muscles relaxed by keeping good posture. Check your jaw, face, neck, and shoulder muscles for tension, and try relaxing them. When sitting at a desk, change positions often, and stretch for 30 seconds each hour. · Get plenty of sleep and exercise. · Eat regularly and well. Long periods without food can trigger a headache. · Treat yourself to a massage. Some people find that regular massages are very helpful in relieving tension. · Limit caffeine by not drinking too much coffee, tea, or soda. But don't quit caffeine suddenly, because that can also give you headaches. · Reduce eyestrain from computers by blinking frequently and looking away from the computer screen every so often. Make sure you have proper eyewear and that your monitor is set up properly, about an arm's length away. · Seek help if you have depression or anxiety. Your headaches may be linked to these conditions. Treatment can both prevent headaches and help with symptoms of anxiety or depression. When should you call for help? Call 911 anytime you think you may need emergency care. For example, call if: 
? · You have signs of a stroke. These may include: 
¨ Sudden numbness, paralysis, or weakness in your face, arm, or leg, especially on only one side of your body. ¨ Sudden vision changes. ¨ Sudden trouble speaking. ¨ Sudden confusion or trouble understanding simple statements. ¨ Sudden problems with walking or balance. ¨ A sudden, severe headache that is different from past headaches. ?Call your doctor now or seek immediate medical care if: 
? · You have a new or worse headache. ? · Your headache gets much worse. Where can you learn more? Go to http://jocelyn-sisi.info/. Enter M271 in the search box to learn more about \"Headache: Care Instructions. \" Current as of: October 14, 2016 Content Version: 11.4 © 9580-1901 [employer]Dplay. Care instructions adapted under license by [employer] (which disclaims liability or warranty for this information). If you have questions about a medical condition or this instruction, always ask your healthcare professional. [place] [address] any warranty or liability for your use of this information. Hip Pain: Care Instructions Your Care Instructions Hip pain may be caused by many things, including overuse, a fall, or a twisting movement. Another cause of hip pain is arthritis. Your pain may increase when you stand up, walk, or squat. The pain may come and go or may be constant. Home treatment can help relieve hip pain, swelling, and stiffness. If your pain is ongoing, you may need more tests and treatment. Follow-up care is a key part of your treatment and safety. Be sure to make and go to all appointments, and call your doctor if you are having problems. It's also a good idea to know your test results and keep a list of the medicines you take. How can you care for yourself at home? · Take pain medicines exactly as directed. ¨ If the doctor gave you a prescription medicine for pain, take it as prescribed. ¨ If you are not taking a prescription pain medicine, ask your doctor if you can take an over-the-counter medicine. · Rest and protect your hip. Take a break from any activity, including standing or walking, that may cause pain. · Put ice or a cold pack against your hip for 10 to 20 minutes at a time. Try to do this every 1 to 2 hours for the next 3 days (when you are awake) or until the swelling goes down. Put a thin cloth between the ice and your skin. · Sleep on your healthy side with a pillow between your knees, or sleep on your back with pillows under your knees. · If there is no swelling, you can put moist heat, a heating pad, or a warm cloth on your hip. Do gentle stretching exercises to help keep your hip flexible. · Learn how to prevent falls. Have your vision and hearing checked regularly. Wear slippers or shoes with a nonskid sole. · Stay at a healthy weight. · Wear comfortable shoes. When should you call for help? Call 911 anytime you think you may need emergency care. For example, call if: 
? · You have sudden chest pain and shortness of breath, or you cough up blood. ? · You are not able to stand or walk or bear weight. ? · Your buttocks, legs, or feet feel numb or tingly. ? · Your leg or foot is cool or pale or changes color. ? · You have severe pain. ?Call your doctor now or seek immediate medical care if: 
? · You have signs of infection, such as: 
¨ Increased pain, swelling, warmth, or redness in the hip area. ¨ Red streaks leading from the hip area. ¨ Pus draining from the hip area. ¨ A fever. ? · You have signs of a blood clot, such as: 
¨ Pain in your calf, back of the knee, thigh, or groin. ¨ Redness and swelling in your leg or groin. ? · You are not able to bend, straighten, or move your leg normally. ? · You have trouble urinating or having bowel movements. ? Watch closely for changes in your health, and be sure to contact your doctor if: 
? · You do not get better as expected. Where can you learn more? Go to http://jocelyn-sisi.info/. Enter L251 in the search box to learn more about \"Hip Pain: Care Instructions. \" Current as of: March 20, 2017 Content Version: 11.4 © 0133-4178 [employer]zzle.  Care instructions adapted under license by [address] (which disclaims liability or warranty for this information). If you have questions about a medical condition or this instruction, always ask your healthcare professional. [address] any warranty or liability for your use of this information. Introducing [facility]! Dear Jennifer Nurse: Thank you for requesting a Razer account. Our records indicate that you already have an active Razer account. You can access your account anytime at [email or web address] Did you know that you can access your hospital and ER discharge instructions at any time in Razer? You can also review all of your test results from your hospital stay or ER visit. Additional Information If you have questions, please visit the Frequently Asked Questions section of the Razer website at [email or web address] Remember, Razer is NOT to be used for urgent needs. For medical emergencies, dial 911. Now available from your iPhone and Android! Please provide this summary of care documentation to your next provider. Your primary care clinician is listed as Durga Collier. If you have any questions after today's visit, please call [phone].

## 2018-03-03 LAB
ALBUMIN SERPL-MCNC: 4.5 G/DL (ref 3.6–4.8)
ALBUMIN/GLOB SERPL: 1.3 {RATIO} (ref 1.2–2.2)
ALP SERPL-CCNC: 62 IU/L (ref 39–117)
ALT SERPL-CCNC: 11 IU/L (ref 0–32)
AST SERPL-CCNC: 19 IU/L (ref 0–40)
BASOPHILS # BLD AUTO: 0.1 X10E3/UL (ref 0–0.2)
BASOPHILS NFR BLD AUTO: 1 %
BILIRUB SERPL-MCNC: 0.3 MG/DL (ref 0–1.2)
BUN SERPL-MCNC: 13 MG/DL (ref 8–27)
BUN/CREAT SERPL: 12 (ref 12–28)
CALCIUM SERPL-MCNC: 10.6 MG/DL (ref 8.7–10.3)
CHLORIDE SERPL-SCNC: 102 MMOL/L (ref 96–106)
CO2 SERPL-SCNC: 28 MMOL/L (ref 18–29)
CREAT SERPL-MCNC: 1.07 MG/DL (ref 0.57–1)
EOSINOPHIL # BLD AUTO: 0.3 X10E3/UL (ref 0–0.4)
EOSINOPHIL NFR BLD AUTO: 4 %
ERYTHROCYTE [DISTWIDTH] IN BLOOD BY AUTOMATED COUNT: 16.6 % (ref 12.3–15.4)
GFR SERPLBLD CREATININE-BSD FMLA CKD-EPI: 55 ML/MIN/1.73
GFR SERPLBLD CREATININE-BSD FMLA CKD-EPI: 63 ML/MIN/1.73
GLOBULIN SER CALC-MCNC: 3.4 G/DL (ref 1.5–4.5)
GLUCOSE SERPL-MCNC: 114 MG/DL (ref 65–99)
HCT VFR BLD AUTO: 36 % (ref 34–46.6)
HGB BLD-MCNC: 11.2 G/DL (ref 11.1–15.9)
IMM GRANULOCYTES # BLD: 0.1 X10E3/UL (ref 0–0.1)
IMM GRANULOCYTES NFR BLD: 1 %
INTERPRETATION: NORMAL
LYMPHOCYTES # BLD AUTO: 1.9 X10E3/UL (ref 0.7–3.1)
LYMPHOCYTES NFR BLD AUTO: 26 %
MCH RBC QN AUTO: 21.3 PG (ref 26.6–33)
MCHC RBC AUTO-ENTMCNC: 31.1 G/DL (ref 31.5–35.7)
MCV RBC AUTO: 68 FL (ref 79–97)
MONOCYTES # BLD AUTO: 0.9 X10E3/UL (ref 0.1–0.9)
MONOCYTES NFR BLD AUTO: 13 %
NEUTROPHILS # BLD AUTO: 4.2 X10E3/UL (ref 1.4–7)
NEUTROPHILS NFR BLD AUTO: 55 %
PLATELET # BLD AUTO: 376 X10E3/UL (ref 150–379)
POTASSIUM SERPL-SCNC: 3.2 MMOL/L (ref 3.5–5.2)
PROT SERPL-MCNC: 7.9 G/DL (ref 6–8.5)
RBC # BLD AUTO: 5.27 X10E6/UL (ref 3.77–5.28)
SODIUM SERPL-SCNC: 144 MMOL/L (ref 134–144)
WBC # BLD AUTO: 7.5 X10E3/UL (ref 3.4–10.8)

## 2018-03-05 NOTE — PROGRESS NOTES
Potassium is a bit low- please eat a banana every day and return for repeat chemistry in 2 weeks. We will also check calcium and vit d at that time.

## 2018-03-06 NOTE — PROGRESS NOTES
Marco Antonio Wang is a 57 y.o. female who presents with the following complaints:  Chief Complaint   Patient presents with    Medication Refill    Leg Pain     left    Headache       Subjective:    HPI:   Presents for hospital f/u after lum-prasad and subsequent surgical wound infection (pseudomonas). She reports she was discharged with PICC line and recently completed her course of IV antibiotics. Her discharge instructions advised she should make f/u appt with cardiology; patient states she does not understand why. Her  states she had heart arrhythmia while admitted. Was discharged with rx for flecanide but did not , did not realize she had any new meds. Reports she continues to have headaches, has f/u scheduled with Dr. Idania Mora to address. She is taking topomax 100 mg once a day, immediate release formulation. She takes fiorcet if symptoms become severe, 2 times a month or less. C/o left hip pain radiating into the anterior thigh, present for 2-3 weeks, increasing. Aggravated by ambulation, standing. Alleviated by rest. No instability or mechanical symptoms. Pertinent PMH/FH/SH:  Past Medical History:   Diagnosis Date    Headache(784.0)     Hypertension      Past Surgical History:   Procedure Laterality Date    HX HEENT      HX OTHER SURGICAL      Removal of Left eye.      Family History   Problem Relation Age of Onset    Cancer Mother     Stroke Father      Social History     Social History    Marital status: LEGALLY      Spouse name: N/A    Number of children: N/A    Years of education: N/A     Social History Main Topics    Smoking status: Former Smoker    Smokeless tobacco: Never Used    Alcohol use No    Drug use: None    Sexual activity: Not Asked     Other Topics Concern    None     Social History Narrative     Advanced Directives: N      Patient Active Problem List    Diagnosis    Allergic bronchitis    Discomfort    Seasonal allergic rhinitis due to pollen  Essential hypertension    Other allergic rhinitis       Nurse notes were reviewed and are correct  Review of Systems - negative except as listed above in the HPI    Objective:     Vitals:    03/02/18 1507   BP: (!) 135/92   Pulse: 74   Resp: 18   Temp: 97.7 °F (36.5 °C)   TempSrc: Oral   SpO2: 99%   Weight: 228 lb 8 oz (103.6 kg)   Height: 5' 10\" (1.778 m)     Physical Examination: General appearance - alert, well appearing, and in no distress, oriented to person, place, and time, overweight and well hydrated  Mental status - normal mood, behavior, speech, dress, motor activity, and thought processes  Neck - supple, no significant adenopathy  Chest - clear to auscultation, no wheezes, rales or rhonchi, symmetric air entry  Heart - normal rate, regular rhythm, normal S1, S2, no murmurs, rubs, clicks or gallops, no JVD  Abdomen - soft, nontender, nondistended, no masses or organomegaly  bowel sounds normal  Back exam - wound dressing clean, dry, intact. sutures intact, no redness, tenderness, wound drainage. Wound edges well approximated. Neurological - alert, oriented, normal speech, no focal findings or movement disorder noted  Extremities - no pedal edema noted  Skin - normal coloration and turgor, no rashes, no suspicious skin lesions noted    Assessment/ Plan:   Diagnoses and all orders for this visit:    1. Essential hypertension with goal blood pressure less than 130/80  Near goal  DASH diet  Refill rx  -     amLODIPine (NORVASC) 10 mg tablet; TAKE 1 TABLET EVERY DAY  -     metoprolol succinate (TOPROL-XL) 25 mg XL tablet; TAKE 1 TABLET EVERY DAY    2. Fibromyalgia  Continue gabapentin  -     gabapentin (NEURONTIN) 300 mg capsule; Take 1 Cap by mouth three (3) times daily. 3. Daily headache  Change topamax to 50 mg BID  F/u with Dr. Carla Kaplan    4. Chronic bilateral low back pain with bilateral sciatica  S/p lum prasad    5. Left hip pain  Check XR  -     XR HIP LT W OR WO PELV 2-3 VWS; Future    6. Cardiac arrhythmia, unspecified cardiac arrhythmia type  Obtain records from admission  Refer to card for further eval  -     REFERRAL TO CARDIOLOGY    7. Postoperative wound infection, initial encounter  Completed antibiotics  Check labs  Schedule f/u with surgeon, needs suture removal  -     CBC WITH AUTOMATED DIFF  -     METABOLIC PANEL, COMPREHENSIVE    Other orders  -     tiZANidine (ZANAFLEX) 4 mg tablet; Take 1 Tab by mouth nightly. -     butalbital-acetaminophen-caffeine (FIORICET, ESGIC) -40 mg per tablet; Take 1 Tab by mouth every six (6) hours as needed for Pain.  -     hydrOXYzine pamoate (VISTARIL) 25 mg capsule; TAKE 1 CAPSULE EVERY DAY AS NEEDED  FOR  ITCHING MAY REPEAT ONE TIME DAILY  -     topiramate (TOPAMAX) 50 mg tablet; Take 1 Tab by mouth two (2) times a day. -     topiramate (TOPAMAX) 50 mg tablet; Take 1 Tab by mouth two (2) times a day. -     CKD REPORT       Follow-up Disposition:  Return in about 4 weeks (around 3/30/2018). I have discussed the diagnosis with the patient and the intended plan as seen in the above orders. The patient has received an after-visit summary and questions were answered concerning future plans. The patient verbalizes understanding. Medication Side Effects and Warnings were discussed with patient: yes  Patient Labs were reviewed and or requested: yes  Patient Past Records were reviewed and or requested: yes    Patient Instructions          Headache: Care Instructions  Your Care Instructions    Headaches have many possible causes. Most headaches aren't a sign of a more serious problem, and they will get better on their own. Home treatment may help you feel better faster. The doctor has checked you carefully, but problems can develop later. If you notice any problems or new symptoms, get medical treatment right away. Follow-up care is a key part of your treatment and safety.  Be sure to make and go to all appointments, and call your doctor if you are having problems. It's also a good idea to know your test results and keep a list of the medicines you take. How can you care for yourself at home? · Do not drive if you have taken a prescription pain medicine. · Rest in a quiet, dark room until your headache is gone. Close your eyes and try to relax or go to sleep. Don't watch TV or read. · Put a cold, moist cloth or cold pack on the painful area for 10 to 20 minutes at a time. Put a thin cloth between the cold pack and your skin. · Use a warm, moist towel or a heating pad set on low to relax tight shoulder and neck muscles. · Have someone gently massage your neck and shoulders. · Take pain medicines exactly as directed. ¨ If the doctor gave you a prescription medicine for pain, take it as prescribed. ¨ If you are not taking a prescription pain medicine, ask your doctor if you can take an over-the-counter medicine. · Be careful not to take pain medicine more often than the instructions allow, because you may get worse or more frequent headaches when the medicine wears off. · Do not ignore new symptoms that occur with a headache, such as a fever, weakness or numbness, vision changes, or confusion. These may be signs of a more serious problem. To prevent headaches  · Keep a headache diary so you can figure out what triggers your headaches. Avoiding triggers may help you prevent headaches. Record when each headache began, how long it lasted, and what the pain was like (throbbing, aching, stabbing, or dull). Write down any other symptoms you had with the headache, such as nausea, flashing lights or dark spots, or sensitivity to bright light or loud noise. Note if the headache occurred near your period. List anything that might have triggered the headache, such as certain foods (chocolate, cheese, wine) or odors, smoke, bright light, stress, or lack of sleep. · Find healthy ways to deal with stress.  Headaches are most common during or right after stressful times. Take time to relax before and after you do something that has caused a headache in the past.  · Try to keep your muscles relaxed by keeping good posture. Check your jaw, face, neck, and shoulder muscles for tension, and try relaxing them. When sitting at a desk, change positions often, and stretch for 30 seconds each hour. · Get plenty of sleep and exercise. · Eat regularly and well. Long periods without food can trigger a headache. · Treat yourself to a massage. Some people find that regular massages are very helpful in relieving tension. · Limit caffeine by not drinking too much coffee, tea, or soda. But don't quit caffeine suddenly, because that can also give you headaches. · Reduce eyestrain from computers by blinking frequently and looking away from the computer screen every so often. Make sure you have proper eyewear and that your monitor is set up properly, about an arm's length away. · Seek help if you have depression or anxiety. Your headaches may be linked to these conditions. Treatment can both prevent headaches and help with symptoms of anxiety or depression. When should you call for help? Call 911 anytime you think you may need emergency care. For example, call if:  ? · You have signs of a stroke. These may include:  ¨ Sudden numbness, paralysis, or weakness in your face, arm, or leg, especially on only one side of your body. ¨ Sudden vision changes. ¨ Sudden trouble speaking. ¨ Sudden confusion or trouble understanding simple statements. ¨ Sudden problems with walking or balance. ¨ A sudden, severe headache that is different from past headaches. ?Call your doctor now or seek immediate medical care if:  ? · You have a new or worse headache. ? · Your headache gets much worse. Where can you learn more? Go to http://jocelyn-sisi.info/. Enter M271 in the search box to learn more about \"Headache: Care Instructions. \"  Current as of: October 14, 2016  Content Version: 11.4  © 0426-5808 [employer]. Care instructions adapted under license by [employer] (which disclaims liability or warranty for this information). If you have questions about a medical condition or this instruction, always ask your healthcare professional. [address] any warranty or liability for your use of this information. Hip Pain: Care Instructions  Your Care Instructions    Hip pain may be caused by many things, including overuse, a fall, or a twisting movement. Another cause of hip pain is arthritis. Your pain may increase when you stand up, walk, or squat. The pain may come and go or may be constant. Home treatment can help relieve hip pain, swelling, and stiffness. If your pain is ongoing, you may need more tests and treatment. Follow-up care is a key part of your treatment and safety. Be sure to make and go to all appointments, and call your doctor if you are having problems. It's also a good idea to know your test results and keep a list of the medicines you take. How can you care for yourself at home? · Take pain medicines exactly as directed. ¨ If the doctor gave you a prescription medicine for pain, take it as prescribed. ¨ If you are not taking a prescription pain medicine, ask your doctor if you can take an over-the-counter medicine. · Rest and protect your hip. Take a break from any activity, including standing or walking, that may cause pain. · Put ice or a cold pack against your hip for 10 to 20 minutes at a time. Try to do this every 1 to 2 hours for the next 3 days (when you are awake) or until the swelling goes down. Put a thin cloth between the ice and your skin. · Sleep on your healthy side with a pillow between your knees, or sleep on your back with pillows under your knees. · If there is no swelling, you can put moist heat, a heating pad, or a warm cloth on your hip.  Do gentle stretching exercises to help keep your hip flexible. · Learn how to prevent falls. Have your vision and hearing checked regularly. Wear slippers or shoes with a nonskid sole. · Stay at a healthy weight. · Wear comfortable shoes. When should you call for help? Call 911 anytime you think you may need emergency care. For example, call if:  ? · You have sudden chest pain and shortness of breath, or you cough up blood. ? · You are not able to stand or walk or bear weight. ? · Your buttocks, legs, or feet feel numb or tingly. ? · Your leg or foot is cool or pale or changes color. ? · You have severe pain. ?Call your doctor now or seek immediate medical care if:  ? · You have signs of infection, such as:  ¨ Increased pain, swelling, warmth, or redness in the hip area. ¨ Red streaks leading from the hip area. ¨ Pus draining from the hip area. ¨ A fever. ? · You have signs of a blood clot, such as:  ¨ Pain in your calf, back of the knee, thigh, or groin. ¨ Redness and swelling in your leg or groin. ? · You are not able to bend, straighten, or move your leg normally. ? · You have trouble urinating or having bowel movements. ? Watch closely for changes in your health, and be sure to contact your doctor if:  ? · You do not get better as expected. Where can you learn more? Go to http://jocelyn-sisi.info/. Enter H284 in the search box to learn more about \"Hip Pain: Care Instructions. \"  Current as of: March 20, 2017  Content Version: 11.4  © 7329-0572 [employer]. Care instructions adapted under license by [employer] (which disclaims liability or warranty for this information). If you have questions about a medical condition or this instruction, always ask your healthcare professional. [place] [address] any warranty or liability for your use of this information.           Deanna HOLLINS

## 2018-03-08 ENCOUNTER — HOSPITAL ENCOUNTER (OUTPATIENT)
Dept: GENERAL RADIOLOGY | Age: 63
Discharge: HOME | End: 2018-03-08
Attending: NURSE PRACTITIONER
Payer: MEDICARE

## 2018-03-08 ENCOUNTER — TELEPHONE (OUTPATIENT)
Dept: FAMILY MEDICINE CLINIC | Age: 63
End: 2018-03-08

## 2018-03-08 DIAGNOSIS — M25.552 LEFT HIP PAIN: ICD-10-CM

## 2018-03-08 PROCEDURE — 73502 X-RAY EXAM HIP UNI 2-3 VIEWS: CPT

## 2018-03-08 NOTE — TELEPHONE ENCOUNTER
Patient called for xr results. She states that she still has pain when walking. She would like to know what to do.

## 2018-03-08 NOTE — TELEPHONE ENCOUNTER
Patient advised to discuss pain with spine surgeon. Patient verbalized understanding and had no questions at this time.

## 2018-03-20 ENCOUNTER — OFFICE VISIT (OUTPATIENT)
Dept: FAMILY MEDICINE CLINIC | Age: 63
End: 2018-03-20

## 2018-03-20 VITALS
WEIGHT: 229.4 LBS | SYSTOLIC BLOOD PRESSURE: 170 MMHG | OXYGEN SATURATION: 99 % | RESPIRATION RATE: 19 BRPM | TEMPERATURE: 98.3 F | DIASTOLIC BLOOD PRESSURE: 107 MMHG | BODY MASS INDEX: 32.84 KG/M2 | HEIGHT: 70 IN | HEART RATE: 82 BPM

## 2018-03-20 DIAGNOSIS — R11.2 NON-INTRACTABLE VOMITING WITH NAUSEA, UNSPECIFIED VOMITING TYPE: ICD-10-CM

## 2018-03-20 DIAGNOSIS — K21.9 GASTROESOPHAGEAL REFLUX DISEASE, ESOPHAGITIS PRESENCE NOT SPECIFIED: Primary | ICD-10-CM

## 2018-03-20 DIAGNOSIS — R68.81 EARLY SATIETY: ICD-10-CM

## 2018-03-20 RX ORDER — OMEPRAZOLE 40 MG/1
40 CAPSULE, DELAYED RELEASE ORAL DAILY
Qty: 30 CAP | Refills: 1 | Status: SHIPPED | OUTPATIENT
Start: 2018-03-20 | End: 2018-05-05 | Stop reason: SDUPTHER

## 2018-03-20 RX ORDER — ONDANSETRON 4 MG/1
4 TABLET, ORALLY DISINTEGRATING ORAL
Qty: 30 TAB | Refills: 1 | Status: SHIPPED | OUTPATIENT
Start: 2018-03-20

## 2018-03-20 NOTE — MR AVS SNAPSHOT
[address] [facility] [address] 
[phone] Patient: Joann Castañeda MRN: [id number] GRS:[id number][birth year]28[birth year] Visit Information Date & Time Provider Department Dept. Phone Encounter #  
 3/20/2018 10:45 AM Brian Cooley NP [address] [facility] [phone] [id number] Follow-up Instructions Return in about 2 weeks (around 4/3/2018) for AWV, f/u BP. Your Appointments 3/30/2018  9:30 AM  
[place] [id number] [facility] ([facility]) Appt Note: follow up visit Monika Delaney [id number]  
[facility] [address] Upcoming Health Maintenance Date Due Hepatitis C Screening 1955 DTaP/Tdap/Td series (1 - Tdap) 6/24/1974 PAP AKA CERVICAL CYTOLOGY 6/24/1974 ZOSTER VACCINE AGE 60> 4/24/2013 FOBT Q 1 YEAR AGE 50-75 11/19/2016 Influenza Age 5 to Adult 8/1/2017 MEDICARE YEARLY EXAM 3/14/2018 BREAST CANCER SCRN MAMMOGRAM 8/25/2019 Allergies as of 3/20/2018  Review Complete On: 3/20/2018 By: Brian Cooley NP No Known Allergies Current Immunizations  Never Reviewed No immunizations on file. Not reviewed this visit You Were Diagnosed With   
  
 Codes Comments Gastroesophageal reflux disease, esophagitis presence not specified    -  Primary ICD-10-CM: K21.9 ICD-9-CM: 530.81 Non-intractable vomiting with nausea, unspecified vomiting type     ICD-10-CM: R11.2 ICD-9-CM: 787.01 Early satiety     ICD-10-CM: R68.81 ICD-9-CM: 780.94 Vitals BP Pulse Temp Resp Height(growth percentile) Weight(growth percentile) (!) 170/107 82 98.3 °F (36.8 °C) (Oral) 19 5' 10\" (1.778 m) 229 lb 6.4 oz (104.1 kg) SpO2 BMI OB Status Smoking Status 99% 32.92 kg/m2 Postmenopausal Former Smoker Vitals History BMI and BSA Data Body Mass Index Body Surface Area  
 32.92 kg/m 2 2.27 m 2 Preferred Pharmacy Pharmacy Name Phone [facility] [address] [place] [address] [phone] Your Updated Medication List  
  
   
This list is accurate as of 3/20/18 11:11 AM.  Always use your most recent med list.  
  
  
  
  
 albuterol 90 mcg/actuation inhaler Commonly known as:  PROVENTIL HFA, VENTOLIN HFA, PROAIR HFA Take 2 Puffs by inhalation every six (6) hours as needed for Wheezing. amLODIPine 10 mg tablet Commonly known as:  Tylene Silas TAKE 1 TABLET EVERY DAY  
  
 butalbital-acetaminophen-caffeine -40 mg per tablet Commonly known as:  Adriane Mussel Take 1 Tab by mouth every six (6) hours as needed for Pain. diclofenac EC 75 mg EC tablet Commonly known as:  VOLTAREN Take 1 Tab by mouth two (2) times a day. diphenoxylate-atropine 2.5-0.025 mg per tablet Commonly known as:  LOMOTIL Take 1 Tab by mouth four (4) times daily as needed for Diarrhea. Max Daily Amount: 4 Tabs. fluticasone 50 mcg/actuation nasal spray Commonly known as:  Jeppie [place] Use 2 sprays in each  nostril daily  
  
 gabapentin 300 mg capsule Commonly known as:  NEURONTIN Take 1 Cap by mouth three (3) times daily. hydrOXYzine pamoate 25 mg capsule Commonly known as:  VISTARIL  
TAKE 1 CAPSULE EVERY DAY AS NEEDED  FOR  ITCHING MAY REPEAT ONE TIME DAILY  
  
 ibuprofen 600 mg tablet Commonly known as:  MOTRIN Take 1 Tab by mouth every six (6) hours as needed for Pain.  
  
 magnesium oxide 400 mg tablet Commonly known as:  MAG-OX Take 1 Tab by mouth daily. metoprolol succinate 25 mg XL tablet Commonly known as:  TOPROL-XL  
TAKE 1 TABLET EVERY DAY  
  
 omeprazole 40 mg capsule Commonly known as:  PRILOSEC Take 1 Cap by mouth daily. ondansetron 4 mg disintegrating tablet Commonly known as:  ZOFRAN ODT  
 Take 1 Tab by mouth every eight (8) hours as needed for Nausea. tiZANidine 4 mg tablet Commonly known as:  Brain Coupe Take 1 Tab by mouth nightly. topiramate 50 mg tablet Commonly known as:  TOPAMAX Take 1 Tab by mouth two (2) times a day. traMADol 50 mg tablet Commonly known as:  ULTRAM  
take 1 tablet by mouth every 6 hours if needed for pain --MAX OF 4 TABS DAILY Prescriptions Sent to Pharmacy Refills  
 omeprazole (PRILOSEC) 40 mg capsule 1 Sig: Take 1 Cap by mouth daily. Class: Normal  
 Pharmacy: [address] [place] [address], [facility] [address] [place] [address] #: [phone] Route: Oral  
  
We Performed the Following REFERRAL TO GASTROENTEROLOGY [LVS48 Custom] Comments:  
 GERd, nausea and vomiting, early satiety/bloating. 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 Referral Information Referral ID Referred By Referred To  
  
 [id number] Pearl Head MD   
   [address], [address] [place] [address] Phone: [phone] Fax: [phone] Visits Status Start Date End Date 1 New Request 3/20/18 3/20/19 If your referral has a status of pending review or denied, additional information will be sent to support the outcome of this decision. Patient Instructions Gastroesophageal Reflux Disease (GERD): Care Instructions Your Care Instructions Gastroesophageal reflux disease (GERD) is the backward flow of stomach acid into the esophagus. The esophagus is the tube that leads from your throat to your stomach. A one-way valve prevents the stomach acid from moving up into this tube. When you have GERD, this valve does not close tightly enough.  
If you have mild GERD symptoms including heartburn, you may be able to control the problem with antacids or over-the-counter medicine. Changing your diet, losing weight, and making other lifestyle changes can also help reduce symptoms. Follow-up care is a key part of your treatment and safety. Be sure to make and go to all appointments, and call your doctor if you are having problems. It's also a good idea to know your test results and keep a list of the medicines you take. How can you care for yourself at home? · Take your medicines exactly as prescribed. Call your doctor if you think you are having a problem with your medicine. · Your doctor may recommend over-the-counter medicine. For mild or occasional indigestion, antacids, such as Tums, Gaviscon, Mylanta, or Maalox, may help. Your doctor also may recommend over-the-counter acid reducers, such as Pepcid AC, Tagamet HB, Zantac 75, or Prilosec. Read and follow all instructions on the label. If you use these medicines often, talk with your doctor. · Change your eating habits. ¨ It's best to eat several small meals instead of two or three large meals. ¨ After you eat, wait 2 to 3 hours before you lie down. ¨ Chocolate, mint, and alcohol can make GERD worse. ¨ Spicy foods, foods that have a lot of acid (like tomatoes and oranges), and coffee can make GERD symptoms worse in some people. If your symptoms are worse after you eat a certain food, you may want to stop eating that food to see if your symptoms get better. · Do not smoke or chew tobacco. Smoking can make GERD worse. If you need help quitting, talk to your doctor about stop-smoking programs and medicines. These can increase your chances of quitting for good. · If you have GERD symptoms at night, raise the head of your bed 6 to 8 inches by putting the frame on blocks or placing a foam wedge under the head of your mattress. (Adding extra pillows does not work.) · Do not wear tight clothing around your middle. · Lose weight if you need to. Losing just 5 to 10 pounds can help. When should you call for help? Call your doctor now or seek immediate medical care if: 
? · You have new or different belly pain. ? · Your stools are black and tarlike or have streaks of blood. ? Watch closely for changes in your health, and be sure to contact your doctor if: 
? · Your symptoms have not improved after 2 days. ? · Food seems to catch in your throat or chest.  
Where can you learn more? Go to http://jocelyn-sisi.info/. Enter R485 in the search box to learn more about \"Gastroesophageal Reflux Disease (GERD): Care Instructions. \" Current as of: May 12, 2017 Content Version: 11.4 © 8323-8341 [employer]. Care instructions adapted under license by [employer]io (which disclaims liability or warranty for this information). If you have questions about a medical condition or this instruction, always ask your healthcare professional. [place] [address] any warranty or liability for your use of this information. DASH Diet: Care Instructions Your Care Instructions The DASH diet is an eating plan that can help lower your blood pressure. DASH stands for Dietary Approaches to Stop Hypertension. Hypertension is high blood pressure. The DASH diet focuses on eating foods that are high in calcium, potassium, and magnesium. These nutrients can lower blood pressure. The foods that are highest in these nutrients are fruits, vegetables, low-fat dairy products, nuts, seeds, and legumes. But taking calcium, potassium, and magnesium supplements instead of eating foods that are high in those nutrients does not have the same effect. The DASH diet also includes whole grains, fish, and poultry. The DASH diet is one of several lifestyle changes your doctor may recommend to lower your high blood pressure.  Your doctor may also want you to decrease the amount of sodium in your diet. Lowering sodium while following the DASH diet can lower blood pressure even further than just the DASH diet alone. Follow-up care is a key part of your treatment and safety. Be sure to make and go to all appointments, and call your doctor if you are having problems. It's also a good idea to know your test results and keep a list of the medicines you take. How can you care for yourself at home? Following the DASH diet · Eat 4 to 5 servings of fruit each day. A serving is 1 medium-sized piece of fruit, ½ cup chopped or canned fruit, 1/4 cup dried fruit, or 4 ounces (½ cup) of fruit juice. Choose fruit more often than fruit juice. · Eat 4 to 5 servings of vegetables each day. A serving is 1 cup of lettuce or raw leafy vegetables, ½ cup of chopped or cooked vegetables, or 4 ounces (½ cup) of vegetable juice. Choose vegetables more often than vegetable juice. · Get 2 to 3 servings of low-fat and fat-free dairy each day. A serving is 8 ounces of milk, 1 cup of yogurt, or 1 ½ ounces of cheese. · Eat 6 to 8 servings of grains each day. A serving is 1 slice of bread, 1 ounce of dry cereal, or ½ cup of cooked rice, pasta, or cooked cereal. Try to choose whole-grain products as much as possible. · Limit lean meat, poultry, and fish to 2 servings each day. A serving is 3 ounces, about the size of a deck of cards. · Eat 4 to 5 servings of nuts, seeds, and legumes (cooked dried beans, lentils, and split peas) each week. A serving is 1/3 cup of nuts, 2 tablespoons of seeds, or ½ cup of cooked beans or peas. · Limit fats and oils to 2 to 3 servings each day. A serving is 1 teaspoon of vegetable oil or 2 tablespoons of salad dressing. · Limit sweets and added sugars to 5 servings or less a week. A serving is 1 tablespoon jelly or jam, ½ cup sorbet, or 1 cup of lemonade. · Eat less than 2,300 milligrams (mg) of sodium a day.  If you limit your sodium to 1,500 mg a day, you can lower your blood pressure even more. Tips for success · Start small. Do not try to make dramatic changes to your diet all at once. You might feel that you are missing out on your favorite foods and then be more likely to not follow the plan. Make small changes, and stick with them. Once those changes become habit, add a few more changes. · Try some of the following: ¨ Make it a goal to eat a fruit or vegetable at every meal and at snacks. This will make it easy to get the recommended amount of fruits and vegetables each day. ¨ Try yogurt topped with fruit and nuts for a snack or healthy dessert. ¨ Add lettuce, tomato, cucumber, and onion to sandwiches. ¨ Combine a ready-made pizza crust with low-fat mozzarella cheese and lots of vegetable toppings. Try using tomatoes, squash, spinach, broccoli, carrots, cauliflower, and onions. ¨ Have a variety of cut-up vegetables with a low-fat dip as an appetizer instead of chips and dip. ¨ Sprinkle sunflower seeds or chopped almonds over salads. Or try adding chopped walnuts or almonds to cooked vegetables. ¨ Try some vegetarian meals using beans and peas. Add garbanzo or kidney beans to salads. Make burritos and tacos with mashed tejada beans or black beans. Where can you learn more? Go to http://jocelyn-sisi.info/. Enter M766 in the search box to learn more about \"DASH Diet: Care Instructions. \" Current as of: September 21, 2016 Content Version: 11.4 © 1371-2393 [employer]. Care instructions adapted under license by [employer] (which disclaims liability or warranty for this information). If you have questions about a medical condition or this instruction, always ask your healthcare professional. [address] any warranty or liability for your use of this information. Introducing [facility]! Dear Yun [place]: Thank you for requesting a hoohbe account. Our records indicate that you already have an active hoohbe account. You can access your account anytime at [email or web address] Did you know that you can access your hospital and ER discharge instructions at any time in hoohbe? You can also review all of your test results from your hospital stay or ER visit. Additional Information If you have questions, please visit the Frequently Asked Questions section of the hoohbe website at [email or web address] Remember, hoohbe is NOT to be used for urgent needs. For medical emergencies, dial 911. Now available from your iPhone and Android! Please provide this summary of care documentation to your next provider. Your primary care clinician is listed as Demarco Case. If you have any questions after today's visit, please call [phone].

## 2018-03-20 NOTE — PATIENT INSTRUCTIONS
Gastroesophageal Reflux Disease (GERD): Care Instructions  Your Care Instructions    Gastroesophageal reflux disease (GERD) is the backward flow of stomach acid into the esophagus. The esophagus is the tube that leads from your throat to your stomach. A one-way valve prevents the stomach acid from moving up into this tube. When you have GERD, this valve does not close tightly enough. If you have mild GERD symptoms including heartburn, you may be able to control the problem with antacids or over-the-counter medicine. Changing your diet, losing weight, and making other lifestyle changes can also help reduce symptoms. Follow-up care is a key part of your treatment and safety. Be sure to make and go to all appointments, and call your doctor if you are having problems. It's also a good idea to know your test results and keep a list of the medicines you take. How can you care for yourself at home? · Take your medicines exactly as prescribed. Call your doctor if you think you are having a problem with your medicine. · Your doctor may recommend over-the-counter medicine. For mild or occasional indigestion, antacids, such as Tums, Gaviscon, Mylanta, or Maalox, may help. Your doctor also may recommend over-the-counter acid reducers, such as Pepcid AC, Tagamet HB, Zantac 75, or Prilosec. Read and follow all instructions on the label. If you use these medicines often, talk with your doctor. · Change your eating habits. ¨ It's best to eat several small meals instead of two or three large meals. ¨ After you eat, wait 2 to 3 hours before you lie down. ¨ Chocolate, mint, and alcohol can make GERD worse. ¨ Spicy foods, foods that have a lot of acid (like tomatoes and oranges), and coffee can make GERD symptoms worse in some people. If your symptoms are worse after you eat a certain food, you may want to stop eating that food to see if your symptoms get better.   · Do not smoke or chew tobacco. Smoking can make GERD worse. If you need help quitting, talk to your doctor about stop-smoking programs and medicines. These can increase your chances of quitting for good. · If you have GERD symptoms at night, raise the head of your bed 6 to 8 inches by putting the frame on blocks or placing a foam wedge under the head of your mattress. (Adding extra pillows does not work.)  · Do not wear tight clothing around your middle. · Lose weight if you need to. Losing just 5 to 10 pounds can help. When should you call for help? Call your doctor now or seek immediate medical care if:  ? · You have new or different belly pain. ? · Your stools are black and tarlike or have streaks of blood. ? Watch closely for changes in your health, and be sure to contact your doctor if:  ? · Your symptoms have not improved after 2 days. ? · Food seems to catch in your throat or chest.   Where can you learn more? Go to http://jocelyn-sisi.info/. Enter S628 in the search box to learn more about \"Gastroesophageal Reflux Disease (GERD): Care Instructions. \"  Current as of: May 12, 2017  Content Version: 11.4  © 3550-7852 [employer]. Care instructions adapted under license by [employer] (which disclaims liability or warranty for this information). If you have questions about a medical condition or this instruction, always ask your healthcare professional. [address] any warranty or liability for your use of this information. DASH Diet: Care Instructions  Your Care Instructions    The DASH diet is an eating plan that can help lower your blood pressure. DASH stands for Dietary Approaches to Stop Hypertension. Hypertension is high blood pressure. The DASH diet focuses on eating foods that are high in calcium, potassium, and magnesium. These nutrients can lower blood pressure.  The foods that are highest in these nutrients are fruits, vegetables, low-fat dairy products, nuts, seeds, and legumes. But taking calcium, potassium, and magnesium supplements instead of eating foods that are high in those nutrients does not have the same effect. The DASH diet also includes whole grains, fish, and poultry. The DASH diet is one of several lifestyle changes your doctor may recommend to lower your high blood pressure. Your doctor may also want you to decrease the amount of sodium in your diet. Lowering sodium while following the DASH diet can lower blood pressure even further than just the DASH diet alone. Follow-up care is a key part of your treatment and safety. Be sure to make and go to all appointments, and call your doctor if you are having problems. It's also a good idea to know your test results and keep a list of the medicines you take. How can you care for yourself at home? Following the DASH diet  · Eat 4 to 5 servings of fruit each day. A serving is 1 medium-sized piece of fruit, ½ cup chopped or canned fruit, 1/4 cup dried fruit, or 4 ounces (½ cup) of fruit juice. Choose fruit more often than fruit juice. · Eat 4 to 5 servings of vegetables each day. A serving is 1 cup of lettuce or raw leafy vegetables, ½ cup of chopped or cooked vegetables, or 4 ounces (½ cup) of vegetable juice. Choose vegetables more often than vegetable juice. · Get 2 to 3 servings of low-fat and fat-free dairy each day. A serving is 8 ounces of milk, 1 cup of yogurt, or 1 ½ ounces of cheese. · Eat 6 to 8 servings of grains each day. A serving is 1 slice of bread, 1 ounce of dry cereal, or ½ cup of cooked rice, pasta, or cooked cereal. Try to choose whole-grain products as much as possible. · Limit lean meat, poultry, and fish to 2 servings each day. A serving is 3 ounces, about the size of a deck of cards. · Eat 4 to 5 servings of nuts, seeds, and legumes (cooked dried beans, lentils, and split peas) each week. A serving is 1/3 cup of nuts, 2 tablespoons of seeds, or ½ cup of cooked beans or peas.   · Limit fats and oils to 2 to 3 servings each day. A serving is 1 teaspoon of vegetable oil or 2 tablespoons of salad dressing. · Limit sweets and added sugars to 5 servings or less a week. A serving is 1 tablespoon jelly or jam, ½ cup sorbet, or 1 cup of lemonade. · Eat less than 2,300 milligrams (mg) of sodium a day. If you limit your sodium to 1,500 mg a day, you can lower your blood pressure even more. Tips for success  · Start small. Do not try to make dramatic changes to your diet all at once. You might feel that you are missing out on your favorite foods and then be more likely to not follow the plan. Make small changes, and stick with them. Once those changes become habit, add a few more changes. · Try some of the following:  ¨ Make it a goal to eat a fruit or vegetable at every meal and at snacks. This will make it easy to get the recommended amount of fruits and vegetables each day. ¨ Try yogurt topped with fruit and nuts for a snack or healthy dessert. ¨ Add lettuce, tomato, cucumber, and onion to sandwiches. ¨ Combine a ready-made pizza crust with low-fat mozzarella cheese and lots of vegetable toppings. Try using tomatoes, squash, spinach, broccoli, carrots, cauliflower, and onions. ¨ Have a variety of cut-up vegetables with a low-fat dip as an appetizer instead of chips and dip. ¨ Sprinkle sunflower seeds or chopped almonds over salads. Or try adding chopped walnuts or almonds to cooked vegetables. ¨ Try some vegetarian meals using beans and peas. Add garbanzo or kidney beans to salads. Make burritos and tacos with mashed tejada beans or black beans. Where can you learn more? Go to http://jocelyn-sisi.info/. Enter W672 in the search box to learn more about \"DASH Diet: Care Instructions. \"  Current as of: September 21, 2016  Content Version: 11.4  © 5578-3391 [employer].  Care instructions adapted under license by [employer]) (which disclaims liability or warranty for this information). If you have questions about a medical condition or this instruction, always ask your healthcare professional. [place] [address] any warranty or liability for your use of this information.

## 2018-03-20 NOTE — PROGRESS NOTES
Chief Complaint   Patient presents with    Heartburn     reflux     Pt c/o reflux. Pt states that she burps excessively and shes having a hare time keeping things down after eating.

## 2018-03-21 NOTE — PROGRESS NOTES
Parker Lamas is a 57 y.o. female who presents with the following complaints:  Chief Complaint   Patient presents with    Heartburn     reflux       Subjective:    HPI:   C/o epigastric pain, nausea, vomiting after eating, and early satiety/feeling full all the time for 3 weeks. Also notes acid indigestion and burning pain in epigastrum and lower chest. Was previously taking omeprazole but stopped after being advised it was not good to take long term. She has not lost weight since her last visit. The zofran did help with nausea but she has exhausted her supply. Reports she took her BP medication this am as ordered. She feels her BP is up due to the vomiting- she has had dry heaves several times since arriving in the office, and due to pain in her left leg. Pertinent PMH/FH/SH:  Past Medical History:   Diagnosis Date    Headache(784.0)     Hypertension      Past Surgical History:   Procedure Laterality Date    HX HEENT      HX OTHER SURGICAL      Removal of Left eye.      Family History   Problem Relation Age of Onset    Cancer Mother     Stroke Father      Social History     Social History    Marital status: LEGALLY      Spouse name: N/A    Number of children: N/A    Years of education: N/A     Social History Main Topics    Smoking status: Former Smoker    Smokeless tobacco: Never Used    Alcohol use No    Drug use: None    Sexual activity: Not Asked     Other Topics Concern    None     Social History Narrative     Advanced Directives: N      Patient Active Problem List    Diagnosis    Allergic bronchitis    Discomfort    Seasonal allergic rhinitis due to pollen    Essential hypertension    Other allergic rhinitis       Nurse notes were reviewed and are correct  Review of Systems - negative except as listed above in the HPI    Objective:     Vitals:    03/20/18 1057 03/20/18 1100   BP: (!) 181/109 (!) 170/107   Pulse: 82    Resp: 19    Temp: 98.3 °F (36.8 °C)    TempSrc: Oral SpO2: 99%    Weight: 229 lb 6.4 oz (104.1 kg)    Height: 5' 10\" (1.778 m)      Wt Readings from Last 3 Encounters:   03/20/18 229 lb 6.4 oz (104.1 kg)   03/02/18 228 lb 8 oz (103.6 kg)   11/24/17 241 lb (109.3 kg)       Physical Examination: General appearance - alert, well appearing, and in no distress, oriented to person, place, and time, overweight and well hydrated  Mental status - normal mood, behavior, speech, dress, motor activity, and thought processes  Neck - supple, no significant adenopathy  Chest - clear to auscultation, no wheezes, rales or rhonchi, symmetric air entry  Heart - normal rate, regular rhythm, normal S1, S2, no murmurs, rubs, clicks or gallops, normal bilateral carotid upstroke without bruits, no JVD  Abdomen - soft, nondistended, no masses or organomegaly  tenderness noted mild epigastric  no rebound tenderness noted  bowel sounds normal  Neurological - alert, oriented, normal speech, no focal findings or movement disorder noted  Extremities - no pedal edema noted  Skin - normal coloration and turgor, no rashes, no suspicious skin lesions noted    Assessment/ Plan:   Diagnoses and all orders for this visit:    1. Gastroesophageal reflux disease, esophagitis presence not specified  Add Rx  Refer for further eval  -     REFERRAL TO GASTROENTEROLOGY  -     omeprazole (PRILOSEC) 40 mg capsule; Take 1 Cap by mouth daily. 2. Non-intractable vomiting with nausea, unspecified vomiting type  -     REFERRAL TO GASTROENTEROLOGY  -     ondansetron (ZOFRAN ODT) 4 mg disintegrating tablet; Take 1 Tab by mouth every eight (8) hours as needed for Nausea. 3. Early satiety  -     REFERRAL TO GASTROENTEROLOGY       Follow-up Disposition:  Return in about 2 weeks (around 4/3/2018) for AWV, f/u BP. I have discussed the diagnosis with the patient and the intended plan as seen in the above orders. The patient has received an after-visit summary and questions were answered concerning future plans.  The patient verbalizes understanding. Medication Side Effects and Warnings were discussed with patient: yes  Patient Labs were reviewed and or requested: yes  Patient Past Records were reviewed and or requested: yes    Patient Instructions          Gastroesophageal Reflux Disease (GERD): Care Instructions  Your Care Instructions    Gastroesophageal reflux disease (GERD) is the backward flow of stomach acid into the esophagus. The esophagus is the tube that leads from your throat to your stomach. A one-way valve prevents the stomach acid from moving up into this tube. When you have GERD, this valve does not close tightly enough. If you have mild GERD symptoms including heartburn, you may be able to control the problem with antacids or over-the-counter medicine. Changing your diet, losing weight, and making other lifestyle changes can also help reduce symptoms. Follow-up care is a key part of your treatment and safety. Be sure to make and go to all appointments, and call your doctor if you are having problems. It's also a good idea to know your test results and keep a list of the medicines you take. How can you care for yourself at home? · Take your medicines exactly as prescribed. Call your doctor if you think you are having a problem with your medicine. · Your doctor may recommend over-the-counter medicine. For mild or occasional indigestion, antacids, such as Tums, Gaviscon, Mylanta, or Maalox, may help. Your doctor also may recommend over-the-counter acid reducers, such as Pepcid AC, Tagamet HB, Zantac 75, or Prilosec. Read and follow all instructions on the label. If you use these medicines often, talk with your doctor. · Change your eating habits. ¨ It's best to eat several small meals instead of two or three large meals. ¨ After you eat, wait 2 to 3 hours before you lie down. ¨ Chocolate, mint, and alcohol can make GERD worse.   ¨ Spicy foods, foods that have a lot of acid (like tomatoes and oranges), and coffee can make GERD symptoms worse in some people. If your symptoms are worse after you eat a certain food, you may want to stop eating that food to see if your symptoms get better. · Do not smoke or chew tobacco. Smoking can make GERD worse. If you need help quitting, talk to your doctor about stop-smoking programs and medicines. These can increase your chances of quitting for good. · If you have GERD symptoms at night, raise the head of your bed 6 to 8 inches by putting the frame on blocks or placing a foam wedge under the head of your mattress. (Adding extra pillows does not work.)  · Do not wear tight clothing around your middle. · Lose weight if you need to. Losing just 5 to 10 pounds can help. When should you call for help? Call your doctor now or seek immediate medical care if:  ? · You have new or different belly pain. ? · Your stools are black and tarlike or have streaks of blood. ? Watch closely for changes in your health, and be sure to contact your doctor if:  ? · Your symptoms have not improved after 2 days. ? · Food seems to catch in your throat or chest.   Where can you learn more? Go to http://jocelyn-sisi.info/. Enter S914 in the search box to learn more about \"Gastroesophageal Reflux Disease (GERD): Care Instructions. \"  Current as of: May 12, 2017  Content Version: 11.4  © 3639-9702 [employer]. Care instructions adapted under license by [employer] (which disclaims liability or warranty for this information). If you have questions about a medical condition or this instruction, always ask your healthcare professional. [place] [address] any warranty or liability for your use of this information. DASH Diet: Care Instructions  Your Care Instructions    The DASH diet is an eating plan that can help lower your blood pressure. DASH stands for Dietary Approaches to Stop Hypertension.  Hypertension is high blood pressure. The DASH diet focuses on eating foods that are high in calcium, potassium, and magnesium. These nutrients can lower blood pressure. The foods that are highest in these nutrients are fruits, vegetables, low-fat dairy products, nuts, seeds, and legumes. But taking calcium, potassium, and magnesium supplements instead of eating foods that are high in those nutrients does not have the same effect. The DASH diet also includes whole grains, fish, and poultry. The DASH diet is one of several lifestyle changes your doctor may recommend to lower your high blood pressure. Your doctor may also want you to decrease the amount of sodium in your diet. Lowering sodium while following the DASH diet can lower blood pressure even further than just the DASH diet alone. Follow-up care is a key part of your treatment and safety. Be sure to make and go to all appointments, and call your doctor if you are having problems. It's also a good idea to know your test results and keep a list of the medicines you take. How can you care for yourself at home? Following the DASH diet  · Eat 4 to 5 servings of fruit each day. A serving is 1 medium-sized piece of fruit, ½ cup chopped or canned fruit, 1/4 cup dried fruit, or 4 ounces (½ cup) of fruit juice. Choose fruit more often than fruit juice. · Eat 4 to 5 servings of vegetables each day. A serving is 1 cup of lettuce or raw leafy vegetables, ½ cup of chopped or cooked vegetables, or 4 ounces (½ cup) of vegetable juice. Choose vegetables more often than vegetable juice. · Get 2 to 3 servings of low-fat and fat-free dairy each day. A serving is 8 ounces of milk, 1 cup of yogurt, or 1 ½ ounces of cheese. · Eat 6 to 8 servings of grains each day. A serving is 1 slice of bread, 1 ounce of dry cereal, or ½ cup of cooked rice, pasta, or cooked cereal. Try to choose whole-grain products as much as possible. · Limit lean meat, poultry, and fish to 2 servings each day.  A serving is 3 ounces, about the size of a deck of cards. · Eat 4 to 5 servings of nuts, seeds, and legumes (cooked dried beans, lentils, and split peas) each week. A serving is 1/3 cup of nuts, 2 tablespoons of seeds, or ½ cup of cooked beans or peas. · Limit fats and oils to 2 to 3 servings each day. A serving is 1 teaspoon of vegetable oil or 2 tablespoons of salad dressing. · Limit sweets and added sugars to 5 servings or less a week. A serving is 1 tablespoon jelly or jam, ½ cup sorbet, or 1 cup of lemonade. · Eat less than 2,300 milligrams (mg) of sodium a day. If you limit your sodium to 1,500 mg a day, you can lower your blood pressure even more. Tips for success  · Start small. Do not try to make dramatic changes to your diet all at once. You might feel that you are missing out on your favorite foods and then be more likely to not follow the plan. Make small changes, and stick with them. Once those changes become habit, add a few more changes. · Try some of the following:  ¨ Make it a goal to eat a fruit or vegetable at every meal and at snacks. This will make it easy to get the recommended amount of fruits and vegetables each day. ¨ Try yogurt topped with fruit and nuts for a snack or healthy dessert. ¨ Add lettuce, tomato, cucumber, and onion to sandwiches. ¨ Combine a ready-made pizza crust with low-fat mozzarella cheese and lots of vegetable toppings. Try using tomatoes, squash, spinach, broccoli, carrots, cauliflower, and onions. ¨ Have a variety of cut-up vegetables with a low-fat dip as an appetizer instead of chips and dip. ¨ Sprinkle sunflower seeds or chopped almonds over salads. Or try adding chopped walnuts or almonds to cooked vegetables. ¨ Try some vegetarian meals using beans and peas. Add garbanzo or kidney beans to salads. Make burritos and tacos with mashed tejada beans or black beans. Where can you learn more? Go to http://jocelyn-sisi.info/.   Enter M917 in the search box to learn more about \"DASH Diet: Care Instructions. \"  Current as of: September 21, 2016  Content Version: 11.4  © 6481-6458 [employer]. Care instructions adapted under license by [employer] (which disclaims liability or warranty for this information). If you have questions about a medical condition or this instruction, always ask your healthcare professional. [address] any warranty or liability for your use of this information.           Amaya HOLLINS

## 2018-04-03 ENCOUNTER — OFFICE VISIT (OUTPATIENT)
Dept: FAMILY MEDICINE CLINIC | Age: 63
End: 2018-04-03

## 2018-04-03 ENCOUNTER — TELEPHONE (OUTPATIENT)
Dept: FAMILY MEDICINE CLINIC | Age: 63
End: 2018-04-03

## 2018-04-03 VITALS
HEART RATE: 83 BPM | BODY MASS INDEX: 32.35 KG/M2 | WEIGHT: 226 LBS | TEMPERATURE: 97.8 F | RESPIRATION RATE: 18 BRPM | SYSTOLIC BLOOD PRESSURE: 145 MMHG | DIASTOLIC BLOOD PRESSURE: 88 MMHG | HEIGHT: 70 IN

## 2018-04-03 DIAGNOSIS — Z23 ENCOUNTER FOR IMMUNIZATION: ICD-10-CM

## 2018-04-03 DIAGNOSIS — Z12.31 ENCOUNTER FOR SCREENING MAMMOGRAM FOR MALIGNANT NEOPLASM OF BREAST: ICD-10-CM

## 2018-04-03 DIAGNOSIS — Z13.39 SCREENING FOR ALCOHOLISM: ICD-10-CM

## 2018-04-03 DIAGNOSIS — Z00.00 MEDICARE ANNUAL WELLNESS VISIT, SUBSEQUENT: Primary | ICD-10-CM

## 2018-04-03 DIAGNOSIS — Z13.31 SCREENING FOR DEPRESSION: ICD-10-CM

## 2018-04-03 DIAGNOSIS — Z71.89 ADVANCED DIRECTIVES, COUNSELING/DISCUSSION: ICD-10-CM

## 2018-04-03 NOTE — ACP (ADVANCE CARE PLANNING)
Advance Care Planning    Advance Care Planning (ACP) Provider Conversation Snapshot    Date of ACP Conversation: 04/03/18  Persons included in Conversation:  patient and family  Length of ACP Conversation in minutes:  <16 minutes (Non-Billable)    Authorized Decision Maker (if patient is incapable of making informed decisions): This person is: Other Legally Authorized Decision Maker (e.g. Next of Kin)- her children          For Patients with Decision Making Capacity:   Values/Goals: Exploration of values, goals, and preferences if recovery is not expected, even with continued medical treatment in the event of:  Imminent death  Severe, permanent brain injury  \"In these circumstances, what matters most to you? \"  Care focused more on comfort or quality of life. Conversation Outcomes / Follow-Up Plan:   Recommended completion of Advance Directive form after review of ACP materials and conversation with prospective healthcare agent   Recommended communicating the plan and making copies for the healthcare agent, personal physician, and others as appropriate (e.g., health system)  Recommended review of completed ACP document annually or upon change in health status   Blank forms given with information packet. Will f/u on status at next visit.      Linda Santiago NP  04/03/18

## 2018-04-03 NOTE — MR AVS SNAPSHOT
[address] [facility] [address] 
[phone] Patient: Sharona Menendez MRN: [id number] GYO:[birth year] Visit Information Date & Time Provider Department Dept. Phone Encounter #  
 4/3/2018  9:15 AM Victroina Bolanos  [facility] [phone] [id number] Follow-up Instructions Return in about 1 year (around 4/3/2019). Upcoming Health Maintenance Date Due Hepatitis C Screening 1955 DTaP/Tdap/Td series (1 - Tdap) 6/24/1974 PAP AKA CERVICAL CYTOLOGY 6/24/1974 ZOSTER VACCINE AGE 60> 4/24/2013 FOBT Q 1 YEAR AGE 50-75 11/19/2016 Influenza Age 5 to Adult 8/1/2017 MEDICARE YEARLY EXAM 3/14/2018 Bone Densitometry (Dexa) Screening 6/24/2018 BREAST CANCER SCRN MAMMOGRAM 8/25/2019 Allergies as of 4/3/2018  Review Complete On: 4/3/2018 By: Victorina Bolanos NP No Known Allergies Current Immunizations  Never Reviewed No immunizations on file. Not reviewed this visit You Were Diagnosed With   
  
 Codes Comments Medicare annual wellness visit, subsequent    -  Primary ICD-10-CM: Z00.00 ICD-9-CM: V70.0 Advanced directives, counseling/discussion     ICD-10-CM: Z71.89 ICD-9-CM: V65.49 Screening for alcoholism     ICD-10-CM: Z13.89 ICD-9-CM: V79.1 Screening for depression     ICD-10-CM: Z13.89 ICD-9-CM: V79.0 Encounter for screening mammogram for malignant neoplasm of breast     ICD-10-CM: Z12.31 
ICD-9-CM: V76.12 Encounter for immunization     ICD-10-CM: X18 ICD-9-CM: V03.89 Vitals BP Pulse Temp Resp Height(growth percentile) Weight(growth percentile) 145/88 83 97.8 °F (36.6 °C) (Oral) 18 5' 10\" (1.778 m) 226 lb (102.5 kg) BMI OB Status Smoking Status 32.43 kg/m2 Postmenopausal Former Smoker BMI and BSA Data  Body Mass Index Body Surface Area  
 32.43 kg/m 2 2.25 m 2  
  
  
 Preferred Pharmacy Pharmacy Name Phone [facility], [place] - [address] [phone] Your Updated Medication List  
  
   
This list is accurate as of 4/3/18 10:21 AM.  Always use your most recent med list.  
  
  
  
  
 albuterol 90 mcg/actuation inhaler Commonly known as:  PROVENTIL HFA, VENTOLIN HFA, PROAIR HFA Take 2 Puffs by inhalation every six (6) hours as needed for Wheezing. amLODIPine 10 mg tablet Commonly known as:  French Barges TAKE 1 TABLET EVERY DAY  
  
 butalbital-acetaminophen-caffeine -40 mg per tablet Commonly known as:  Warren Devoid Take 1 Tab by mouth every six (6) hours as needed for Pain. diclofenac EC 75 mg EC tablet Commonly known as:  VOLTAREN Take 1 Tab by mouth two (2) times a day. diphenoxylate-atropine 2.5-0.025 mg per tablet Commonly known as:  LOMOTIL Take 1 Tab by mouth four (4) times daily as needed for Diarrhea. Max Daily Amount: 4 Tabs. fluticasone 50 mcg/actuation nasal spray Commonly known as:  Jason Maria Guadalupe Use 2 sprays in each  nostril daily  
  
 gabapentin 300 mg capsule Commonly known as:  NEURONTIN Take 1 Cap by mouth three (3) times daily. hydrOXYzine pamoate 25 mg capsule Commonly known as:  VISTARIL  
TAKE 1 CAPSULE EVERY DAY AS NEEDED  FOR  ITCHING MAY REPEAT ONE TIME DAILY  
  
 ibuprofen 600 mg tablet Commonly known as:  MOTRIN Take 1 Tab by mouth every six (6) hours as needed for Pain.  
  
 magnesium oxide 400 mg tablet Commonly known as:  MAG-OX Take 1 Tab by mouth daily. metoprolol succinate 25 mg XL tablet Commonly known as:  TOPROL-XL  
TAKE 1 TABLET EVERY DAY  
  
 omeprazole 40 mg capsule Commonly known as:  PRILOSEC Take 1 Cap by mouth daily. ondansetron 4 mg disintegrating tablet Commonly known as:  ZOFRAN ODT Take 1 Tab by mouth every eight (8) hours as needed for Nausea. tiZANidine 4 mg tablet Commonly known as:  Wannetta Jon Take 1 Tab by mouth nightly. topiramate 50 mg tablet Commonly known as:  TOPAMAX Take 1 Tab by mouth two (2) times a day. traMADol 50 mg tablet Commonly known as:  ULTRAM  
take 1 tablet by mouth every 6 hours if needed for pain --MAX OF 4 TABS DAILY  
  
 varicella-zoster recombinant (PF) 50 mcg/0.5 mL Susr injection Commonly known as:  SHINGRIX (PF)  
0.5 mL by IntraMUSCular route once for 1 dose. Prescriptions Printed Refills  
 varicella-zoster recombinant, PF, (SHINGRIX, PF,) 50 mcg/0.5 mL susr injection 0 Si.5 mL by IntraMUSCular route once for 1 dose. Class: Print Route: IntraMUSCular We Performed the Following [address] [[id number] \[facility]\""] NM ANNUAL ALCOHOL SCREEN 15 MIN [id number] \[facility]\""] Follow-up Instructions Return in about 1 year (around 4/3/2019). To-Do List   
 2018 Imaging:  JOSE MAMMO BI SCREENING INCL CAD Patient Instructions Medicare Wellness Visit, Female The best way to live healthy is to have a healthy lifestyle by eating a well-balanced diet, exercising regularly, limiting alcohol and stopping smoking. Regular physical exams and screening tests are another way to keep healthy. Preventive exams provided by your health care provider can find health problems before they become diseases or illnesses. Preventive services including immunizations, screening tests, monitoring and exams can help you take care of your own health. All people over age 72 should have a pneumovax  and and a prevnar shot to prevent pneumonia. These are once in a lifetime unless you and your provider decide differently. All people over 65 should have a yearly flu shot and a tetanus vaccine every 10 years. A bone mass density to screen for osteoporosis or thinning of the bones should be done every 2 years after 65. Screening for diabetes mellitus with a blood sugar test should be done every year. Glaucoma is a disease of the eye due to increased ocular pressure that can lead to blindness and it should be done every year by an eye professional. 
 
Cardiovascular screening tests that check for elevated lipids (fatty part of blood) which can lead to heart disease and strokes should be done every 5 years. Colorectal screening that evaluates for blood or polyps in your colon should be done yearly as a stool test or every five years as a flexible sigmoidoscope or every 10 years as a colonoscopy up to age 76. Breast cancer screening with a mammogram is recommended biennially  for women age 54-69. Screening for cervical cancer with a pap smear and pelvic exam is recommended for women after age 72 years every 2 years up to age 79 or when the provider and patient decide to stop. If there is a history of cervical abnormalities or other increased risk for cancer then the test is recommended yearly. Hepatitis C screening is also recommended for anyone born between [birth year] through [address]. A shingles vaccine is also recommended once in a lifetime after age 61. Your Medicare Wellness Exam is recommended annually. Here is a list of your current Health Maintenance items with a due date: 
Health Maintenance Due Topic Date Due  
 Hepatitis C Test  1955  DTaP/Tdap/Td  (1 - Tdap) 06/24/1974  Cervical Cancer Screening  06/24/1974  Shingles Vaccine  04/24/2013  Stool testing for trace blood  11/19/2016  Flu Vaccine  08/01/2017 Justina Matamoros Annual Well Visit  03/14/2018  Bone Mineral Density   06/24/2018 [facility] [place] [facility] Dear Myron Malhotra: Thank you for requesting a Publicate account. Our records indicate that you already have an active Publicate account. You can access your account anytime at [email or web address] Did you know that you can access your hospital and ER discharge instructions at any time in ClaimKit? You can also review all of your test results from your hospital stay or ER visit. Additional Information If you have questions, please visit the Frequently Asked Questions section of the ClaimKit website at [email or web address] Remember, ClaimKit is NOT to be used for urgent needs. For medical emergencies, dial 911. Now available from your iPhone and Android! Please provide this summary of care documentation to your next provider. Your primary care clinician is listed as Venkat Ocampo. If you have any questions after today's visit, please call [phone].

## 2018-04-03 NOTE — PROGRESS NOTES
This is the Subsequent Medicare Annual Wellness Exam, performed 12 months or more after the Initial AWV or the last Subsequent AWV    I have reviewed the patient's medical history in detail and updated the computerized patient record. History     Accompanied by . Past Medical History:   Diagnosis Date    Headache(784.0)     Hypertension       Past Surgical History:   Procedure Laterality Date    HX HEENT      HX OTHER SURGICAL      Removal of Left eye. Current Outpatient Prescriptions   Medication Sig Dispense Refill    varicella-zoster recombinant, PF, (SHINGRIX, PF,) 50 mcg/0.5 mL susr injection 0.5 mL by IntraMUSCular route once for 1 dose. 0.5 mL 0    omeprazole (PRILOSEC) 40 mg capsule Take 1 Cap by mouth daily. 30 Cap 1    ondansetron (ZOFRAN ODT) 4 mg disintegrating tablet Take 1 Tab by mouth every eight (8) hours as needed for Nausea. 30 Tab 1    tiZANidine (ZANAFLEX) 4 mg tablet Take 1 Tab by mouth nightly. 90 Tab 1    butalbital-acetaminophen-caffeine (FIORICET, ESGIC) -40 mg per tablet Take 1 Tab by mouth every six (6) hours as needed for Pain. 30 Tab 0    amLODIPine (NORVASC) 10 mg tablet TAKE 1 TABLET EVERY DAY 90 Tab 1    metoprolol succinate (TOPROL-XL) 25 mg XL tablet TAKE 1 TABLET EVERY DAY 90 Tab 1    hydrOXYzine pamoate (VISTARIL) 25 mg capsule TAKE 1 CAPSULE EVERY DAY AS NEEDED  FOR  ITCHING MAY REPEAT ONE TIME DAILY 90 Cap 1    gabapentin (NEURONTIN) 300 mg capsule Take 1 Cap by mouth three (3) times daily. 270 Cap 0    topiramate (TOPAMAX) 50 mg tablet Take 1 Tab by mouth two (2) times a day. 180 Tab 1    diphenoxylate-atropine (LOMOTIL) 2.5-0.025 mg per tablet Take 1 Tab by mouth four (4) times daily as needed for Diarrhea. Max Daily Amount: 4 Tabs.  20 Tab 0    traMADol (ULTRAM) 50 mg tablet take 1 tablet by mouth every 6 hours if needed for pain --MAX OF 4 TABS DAILY 60 Tab 0    fluticasone (FLONASE) 50 mcg/actuation nasal spray Use 2 sprays in each nostril daily 48 g 3    ibuprofen (MOTRIN) 600 mg tablet Take 1 Tab by mouth every six (6) hours as needed for Pain. 40 Tab 2    diclofenac EC (VOLTAREN) 75 mg EC tablet Take 1 Tab by mouth two (2) times a day. 60 Tab 3    albuterol (PROVENTIL HFA, VENTOLIN HFA, PROAIR HFA) 90 mcg/actuation inhaler Take 2 Puffs by inhalation every six (6) hours as needed for Wheezing. 1 Inhaler 1    magnesium oxide (MAG-OX) 400 mg tablet Take 1 Tab by mouth daily. 27 Tab 3     No Known Allergies  Family History   Problem Relation Age of Onset    Cancer Mother     Stroke Father      Social History   Substance Use Topics    Smoking status: Former Smoker    Smokeless tobacco: Never Used    Alcohol use No     Patient Active Problem List   Diagnosis Code    Essential hypertension I10    Other allergic rhinitis J30.89    Allergic bronchitis J45.909    Discomfort Z78.9    Seasonal allergic rhinitis due to pollen J30.1       Depression Risk Factor Screening:     PHQ over the last two weeks 4/3/2018   Little interest or pleasure in doing things Not at all   Feeling down, depressed or hopeless Not at all   Total Score PHQ 2 0     Alcohol Risk Factor Screening: You do not drink alcohol or very rarely. Functional Ability and Level of Safety:   Hearing Loss  Hearing is good. Activities of Daily Living  The home contains: handrails  Patient does total self care    Fall Risk  Fall Risk Assessment, last 12 mths 4/3/2018   Able to walk? Yes   Fall in past 12 months?  No       Abuse Screen  Patient is not abused    Cognitive Screening   Evaluation of Cognitive Function:  Has your family/caregiver stated any concerns about your memory: no  Normal    Patient Care Team   Patient Care Team:  Nirmal Jackson MD as PCP - General (Family Practice)  Sarah Hurtado MD as Physician (Neurology)    Assessment/Plan   Education and counseling provided:  End-of-Life planning (with patient's consent)  Pneumococcal Vaccine  Screening Mammography  Bone mass measurement (DEXA)    Diagnoses and all orders for this visit:    1. Medicare annual wellness visit, subsequent    2. Advanced directives, counseling/discussion  See acp note    3. Screening for alcoholism  Negative screening  -     Annual  Alcohol Screen 15 min ()    4. Screening for depression  Negative screening  -     Depression Screen Annual    5. Encounter for screening mammogram for malignant neoplasm of breast  -     Bilateral Digital Screening Mammography; Future    6. Encounter for immunization  -     varicella-zoster recombinant, PF, (SHINGRIX, PF,) 50 mcg/0.5 mL susr injection; 0.5 mL by IntraMUSCular route once for 1 dose. Health Maintenance Due   Topic Date Due    Hepatitis C Screening  1955    DTaP/Tdap/Td series (1 - Tdap) 06/24/1974    PAP AKA CERVICAL CYTOLOGY  06/24/1974    ZOSTER VACCINE AGE 60>  04/24/2013    Bone Densitometry (Dexa) Screening  06/24/2018     Follow-up Disposition:  Return in about 1 year (around 4/3/2019). I have discussed the diagnosis with the patient and the intended plan as seen in the above orders. The patient has received an after-visit summary and questions were answered concerning future plans. Patient conveyed understanding of the plan at the time of the visit.     Xavi Moore NP  04/03/18

## 2018-04-03 NOTE — TELEPHONE ENCOUNTER
Spoke with pt advised her that request was faxed over to [facility] on 3/28 and records have not yet been received. Pt verbalized understanding, and states she would call the hospital to request them herself.

## 2018-04-03 NOTE — PATIENT INSTRUCTIONS

## 2018-04-03 NOTE — PROGRESS NOTES
Chief Complaint   Patient presents with   Arash Riley Annual Wellness Visit     Pt here for annual wellness visit     Pt c/o increase in gabapentin, making her very sleepy

## 2018-05-04 ENCOUNTER — TELEPHONE (OUTPATIENT)
Dept: FAMILY MEDICINE CLINIC | Age: 63
End: 2018-05-04

## 2018-05-05 DIAGNOSIS — K21.9 GASTROESOPHAGEAL REFLUX DISEASE, ESOPHAGITIS PRESENCE NOT SPECIFIED: ICD-10-CM

## 2018-05-05 RX ORDER — OMEPRAZOLE 40 MG/1
CAPSULE, DELAYED RELEASE ORAL
Qty: 60 CAP | Refills: 1 | Status: SHIPPED | OUTPATIENT
Start: 2018-05-05 | End: 2018-07-06 | Stop reason: SDUPTHER

## 2018-05-05 RX ORDER — BUTALBITAL, ACETAMINOPHEN AND CAFFEINE 50; 325; 40 MG/1; MG/1; MG/1
1 TABLET ORAL
Qty: 30 TAB | Refills: 0 | OUTPATIENT
Start: 2018-05-05

## 2018-05-07 NOTE — TELEPHONE ENCOUNTER
Patient is calling back about this refill she is calling and wants \"what physician wants me to drive and wait 45 mins for a prescription for her migraine and she has been to the office already 3x recently\"     Patient then hung up the phone on me

## 2018-05-07 NOTE — TELEPHONE ENCOUNTER
Pt notified that Rx has been denied and she needs to follow-up in office. Pt stated she was just seen in office. Advised that providers are requesting she come in for this medication. Pt stated never mind \"I will just get some from my friend. \"

## 2018-05-22 ENCOUNTER — HOSPITAL ENCOUNTER (OUTPATIENT)
Dept: MRI IMAGING | Age: 63
Discharge: HOME | End: 2018-05-22
Attending: ORTHOPAEDIC SURGERY

## 2018-05-22 DIAGNOSIS — M54.16 LUMBAR RADICULOPATHY: ICD-10-CM

## 2018-07-06 ENCOUNTER — TELEPHONE (OUTPATIENT)
Dept: FAMILY MEDICINE CLINIC | Age: 63
End: 2018-07-06

## 2018-07-06 ENCOUNTER — OFFICE VISIT (OUTPATIENT)
Dept: FAMILY MEDICINE CLINIC | Age: 63
End: 2018-07-06

## 2018-07-06 VITALS
OXYGEN SATURATION: 99 % | WEIGHT: 236.2 LBS | HEART RATE: 145 BPM | BODY MASS INDEX: 33.82 KG/M2 | HEIGHT: 70 IN | TEMPERATURE: 97.9 F | SYSTOLIC BLOOD PRESSURE: 124 MMHG | DIASTOLIC BLOOD PRESSURE: 67 MMHG | RESPIRATION RATE: 19 BRPM

## 2018-07-06 DIAGNOSIS — R05.9 COUGH: ICD-10-CM

## 2018-07-06 DIAGNOSIS — I10 ESSENTIAL HYPERTENSION WITH GOAL BLOOD PRESSURE LESS THAN 130/80: ICD-10-CM

## 2018-07-06 DIAGNOSIS — M79.7 FIBROMYALGIA: ICD-10-CM

## 2018-07-06 DIAGNOSIS — K21.9 GASTROESOPHAGEAL REFLUX DISEASE, ESOPHAGITIS PRESENCE NOT SPECIFIED: ICD-10-CM

## 2018-07-06 DIAGNOSIS — R00.0 TACHYCARDIA: ICD-10-CM

## 2018-07-06 DIAGNOSIS — I47.1 SVT (SUPRAVENTRICULAR TACHYCARDIA) (HCC): Primary | ICD-10-CM

## 2018-07-06 RX ORDER — ALBUTEROL SULFATE 90 UG/1
2 AEROSOL, METERED RESPIRATORY (INHALATION)
Qty: 1 INHALER | Refills: 1 | Status: SHIPPED | OUTPATIENT
Start: 2018-07-06

## 2018-07-06 RX ORDER — GABAPENTIN 300 MG/1
300 CAPSULE ORAL 3 TIMES DAILY
Qty: 270 CAP | Refills: 0 | Status: SHIPPED | OUTPATIENT
Start: 2018-07-06 | End: 2018-09-24 | Stop reason: SDUPTHER

## 2018-07-06 RX ORDER — HYDROXYZINE PAMOATE 25 MG/1
CAPSULE ORAL
Qty: 90 CAP | Refills: 1 | Status: SHIPPED | OUTPATIENT
Start: 2018-07-06 | End: 2018-09-24 | Stop reason: SDUPTHER

## 2018-07-06 RX ORDER — OMEPRAZOLE 40 MG/1
CAPSULE, DELAYED RELEASE ORAL
Qty: 60 CAP | Refills: 1 | Status: SHIPPED | OUTPATIENT
Start: 2018-07-06 | End: 2018-09-24 | Stop reason: SDUPTHER

## 2018-07-06 RX ORDER — BUTALBITAL, ACETAMINOPHEN AND CAFFEINE 50; 325; 40 MG/1; MG/1; MG/1
1 TABLET ORAL
Qty: 30 TAB | Refills: 0 | Status: SHIPPED | OUTPATIENT
Start: 2018-07-06 | End: 2018-07-06 | Stop reason: SDUPTHER

## 2018-07-06 RX ORDER — AMLODIPINE BESYLATE 10 MG/1
TABLET ORAL
Qty: 90 TAB | Refills: 1 | Status: SHIPPED | OUTPATIENT
Start: 2018-07-06

## 2018-07-06 RX ORDER — BUTALBITAL, ACETAMINOPHEN AND CAFFEINE 50; 325; 40 MG/1; MG/1; MG/1
1 TABLET ORAL
Qty: 30 TAB | Refills: 0 | Status: SHIPPED | OUTPATIENT
Start: 2018-07-06

## 2018-07-06 RX ORDER — TIZANIDINE 4 MG/1
4 TABLET ORAL
Qty: 90 TAB | Refills: 1 | Status: SHIPPED | OUTPATIENT
Start: 2018-07-06 | End: 2018-09-07 | Stop reason: ALTCHOICE

## 2018-07-06 NOTE — PATIENT INSTRUCTIONS
Supraventricular Tachycardia: Care Instructions  Your Care Instructions    Having supraventricular tachycardia (SVT) means that from time to time your heart beats abnormally fast. This fast rhythm is caused by changes in the electrical system of your heart. You may feel a fluttering in your chest (palpitations) and have a fast pulse. When your heart is beating fast, you may feel anxious and lightheaded, be short of breath, and feel discomfort in the chest.  Your doctor may prescribe medicines to help slow down your heartbeat. Your doctor may also suggest you try vagal maneuvers when having an episode of SVT. These are things, like bearing down, that might help slow your heart rate. Bearing down means that you try to breathe out with your stomach muscles but you don't let air out of your nose or mouth. Your doctor can show you how to do vagal maneuvers. He or she may suggest you lie down on your back to do them. In some cases, either cardioversion treatment or a procedure called catheter ablation is done to correct SVT. Your doctor may ask you to wear a small electronic device for 1 or 2 days to monitor your heart. It is called a Holter monitor. Follow-up care is a key part of your treatment and safety. Be sure to make and go to all appointments, and call your doctor if you are having problems. It's also a good idea to know your test results and keep a list of the medicines you take. How can you care for yourself at home? · Be safe with medicines. Take your medicines exactly as prescribed. Call your doctor if you think you are having a problem with your medicine. You will get more details on the specific medicines your doctor prescribes. · If your doctor showed you how to do vagal maneuvers, try them when you have an episode. These maneuvers include bearing down or putting an ice-cold, wet towel on your face. · Monitor your condition by keeping a diary of your SVT episodes.  Bring this to your doctor appointments. ¨ Write down how fast or slow your heart was beating. To count your heart rate:  § Gently place 2 fingers of your hand on the inside of your other wrist, below your thumb. § Count the beats for 30 seconds. § Then, double the result to get the number of beats per minute. ¨ Write down if your heart rhythm was regular or irregular. ¨ Write down the symptoms you had. ¨ Write down the time of day your symptoms occurred. ¨ Write down how long your symptoms lasted. ¨ Write down what you were doing when your symptoms started. ¨ Write down what may have helped your symptoms go away. · If they trigger episodes, limit or avoid alcohol or drinks with caffeine. · Do not use over-the-counter decongestants, herbal remedies, diet pills, or \"pep\" pills, which often contain stimulants. · Do not use illegal drugs, such as cocaine, ecstasy, or methamphetamine, which can speed up your heart's rhythm. · Do not smoke. Smoking can make this condition worse. If you need help quitting, talk to your doctor about stop-smoking programs and medicines. These can increase your chances of quitting for good. · Be alert for new or worsening symptoms, such as shortness of breath, pounding of your heart, or unusual tiredness. If new symptoms develop or your symptoms become worse, call your doctor. When should you call for help? Call 911 anytime you think you may need emergency care. For example, call if:  ? · You passed out (lost consciousness). ? · You are short of breath. ?Call your doctor now or seek immediate medical care if:  ? · You have a fast heartbeat. ? · You are dizzy or lightheaded, or feel like you may faint. ? Watch closely for changes in your health, and be sure to contact your doctor if:  ? · You do not get better as expected. Where can you learn more? Go to http://jocelyn-sisi.info/.   Enter G244 in the search box to learn more about \"Supraventricular Tachycardia: Care Instructions. \"  Current as of: September 21, 2016  Content Version: 11.4  © 9615-2702 Healthwise, [facility]. Care instructions adapted under license by [employer] (which disclaims liability or warranty for this information). If you have questions about a medical condition or this instruction, always ask your healthcare professional. [place] [address] any warranty or liability for your use of this information.

## 2018-07-06 NOTE — TELEPHONE ENCOUNTER
Pt requesting Cardiology referral be updated to the following:  Dr. Justina Jacobs  [address] [place] [address]  [phone]  [phone]    No referral auth needed.  Pt has [employer] PPO

## 2018-07-06 NOTE — MR AVS SNAPSHOT
[address] [place] [id number] 
[phone] Patient: Cristiano Ojeda MRN: [id number] LBL:[id number] Visit Information Date & Time Provider Department Dept. Phone Encounter #  
 7/6/2018 11:00 AM Jesus Hoffmann  [facility] [phone] [id number] Upcoming Health Maintenance Date Due Hepatitis C Screening 1955 DTaP/Tdap/Td series (1 - Tdap) 6/24/1974 PAP AKA CERVICAL CYTOLOGY 6/24/1974 ZOSTER VACCINE AGE 60> 4/24/2013 GLAUCOMA SCREENING Q2Y 6/24/2018 Bone Densitometry (Dexa) Screening 6/24/2018 Pneumococcal 65+ Low/Medium Risk (1 of 2 - PCV13) 6/24/2018 Influenza Age 5 to Adult 8/1/2018 MEDICARE YEARLY EXAM 4/4/2019 BREAST CANCER SCRN MAMMOGRAM 8/25/2019 COLONOSCOPY 11/20/2025 Allergies as of 7/6/2018  Review Complete On: 7/6/2018 By: Aris Romero LPN No Known Allergies Current Immunizations  Never Reviewed No immunizations on file. Not reviewed this visit You Were Diagnosed With   
  
 Codes Comments SVT (supraventricular tachycardia) ([facility]    -  Primary ICD-10-CM: I47.1 ICD-9-CM: 427.89 Essential hypertension with goal blood pressure less than 130/80     ICD-10-CM: I10 
ICD-9-CM: 401.9 Gastroesophageal reflux disease, esophagitis presence not specified     ICD-10-CM: K21.9 ICD-9-CM: 530.81 Cough     ICD-10-CM: R05 ICD-9-CM: 458. 2 Fibromyalgia     ICD-10-CM: M79.7 ICD-9-CM: 729.1 Tachycardia     ICD-10-CM: R00.0 ICD-9-CM: 668. 0 Vitals BP Pulse Temp Resp Height(growth percentile) Weight(growth percentile) 124/67 (!) 145 97.9 °F (36.6 °C) (Oral) 19 5' 10\" (1.778 m) 236 lb 3.2 oz (107.1 kg) SpO2 BMI OB Status Smoking Status 99% 33.89 kg/m2 Postmenopausal Former Smoker BMI and BSA Data Body Mass Index Body Surface Area  
 33.89 kg/m 2 2.3 m 2 [facility] [address] [phone] Your Updated Medication List  
  
   
This list is accurate as of 7/6/18 11:32 AM.  Always use your most recent med list.  
  
  
  
  
 albuterol 90 mcg/actuation inhaler Commonly known as:  PROVENTIL HFA, VENTOLIN HFA, PROAIR HFA Take 2 Puffs by inhalation every six (6) hours as needed for Wheezing. amLODIPine 10 mg tablet Commonly known as:  Yoselin Fraction TAKE 1 TABLET EVERY DAY  
  
 butalbital-acetaminophen-caffeine -40 mg per tablet Commonly known as:  Tania Pardon Take 1 Tab by mouth two (2) times daily as needed for Pain. Do not use more than one day a week. diclofenac EC 75 mg EC tablet Commonly known as:  VOLTAREN Take 1 Tab by mouth two (2) times a day. diphenoxylate-atropine 2.5-0.025 mg per tablet Commonly known as:  LOMOTIL Take 1 Tab by mouth four (4) times daily as needed for Diarrhea. Max Daily Amount: 4 Tabs. fluticasone 50 mcg/actuation nasal spray Commonly known as:  Michael Sacks Use 2 sprays in each  nostril daily  
  
 gabapentin 300 mg capsule Commonly known as:  NEURONTIN Take 1 Cap by mouth three (3) times daily. hydrOXYzine pamoate 25 mg capsule Commonly known as:  VISTARIL  
TAKE 1 CAPSULE EVERY DAY AS NEEDED  FOR  ITCHING MAY REPEAT ONE TIME DAILY  
  
 ibuprofen 600 mg tablet Commonly known as:  MOTRIN Take 1 Tab by mouth every six (6) hours as needed for Pain.  
  
 magnesium oxide 400 mg tablet Commonly known as:  MAG-OX Take 1 Tab by mouth daily. metoprolol succinate 25 mg XL tablet Commonly known as:  TOPROL-XL  
TAKE 1 TABLET EVERY DAY  
  
 omeprazole 40 mg capsule Commonly known as:  PRILOSEC  
TAKE 1 CAPSULE EVERY DAY  
  
 ondansetron 4 mg disintegrating tablet Commonly known as:  ZOFRAN ODT Take 1 Tab by mouth every eight (8) hours as needed for Nausea. tiZANidine 4 mg tablet Commonly known as:  Elif Manual Take 1 Tab by mouth nightly. topiramate 50 mg tablet Commonly known as:  TOPAMAX Take 1 Tab by mouth two (2) times a day. traMADol 50 mg tablet Commonly known as:  ULTRAM  
take 1 tablet by mouth every 6 hours if needed for pain --MAX OF 4 TABS DAILY Prescriptions Sent to Pharmacy Refills  
 butalbital-acetaminophen-caffeine (FIORICET, ESGIC) -40 mg per tablet 0 Sig: Take 1 Tab by mouth two (2) times daily as needed for Pain. Do not use more than one day a week. Class: Normal  
 Pharmacy: [address] [place] [address] Ph #: [phone] Route: Oral  
 amLODIPine (NORVASC) 10 mg tablet 1 Sig: TAKE 1 TABLET EVERY DAY Class: Normal  
 Pharmacy: [address] [place] [address] Ph #: [phone]  
 hydrOXYzine pamoate (VISTARIL) 25 mg capsule 1 Sig: TAKE 1 CAPSULE EVERY DAY AS NEEDED  FOR  ITCHING MAY REPEAT ONE TIME DAILY Class: Normal  
 Pharmacy: [address] [place] [address] Ph #: [phone]  
 tiZANidine (ZANAFLEX) 4 mg tablet 1 Sig: Take 1 Tab by mouth nightly. Class: Normal  
 Pharmacy: [address] [place] [address] Ph #: [phone] Route: Oral  
 omeprazole (PRILOSEC) 40 mg capsule 1 Sig: TAKE 1 CAPSULE EVERY DAY Class: Normal  
 Pharmacy: [address] [place] [address] Ph #: [phone]  
 albuterol (PROVENTIL HFA, VENTOLIN HFA, PROAIR HFA) 90 mcg/actuation inhaler 1 Sig: Take 2 Puffs by inhalation every six (6) hours as needed for Wheezing. Class: Normal  
 Pharmacy: [address] [place] [address] Ph #: [phone] Route: Inhalation  
 gabapentin (NEURONTIN) 300 mg capsule 0 Sig: Take 1 Cap by mouth three (3) times daily.   
 Class: Normal  
 Pharmacy: [address] [place] [address], [address] [place] [address] #: [phone] Route: Oral  
  
We Performed the Following AMB POC EKG ROUTINE W/ 12 LEADS, INTER & REP [40685 CPT(R)] Patient Instructions Supraventricular Tachycardia: Care Instructions Your Care Instructions Having supraventricular tachycardia (SVT) means that from time to time your heart beats abnormally fast. This fast rhythm is caused by changes in the electrical system of your heart. You may feel a fluttering in your chest (palpitations) and have a fast pulse. When your heart is beating fast, you may feel anxious and lightheaded, be short of breath, and feel discomfort in the chest. 
Your doctor may prescribe medicines to help slow down your heartbeat. Your doctor may also suggest you try vagal maneuvers when having an episode of SVT. These are things, like bearing down, that might help slow your heart rate. Bearing down means that you try to breathe out with your stomach muscles but you don't let air out of your nose or mouth. Your doctor can show you how to do vagal maneuvers. He or she may suggest you lie down on your back to do them. In some cases, either cardioversion treatment or a procedure called catheter ablation is done to correct SVT. Your doctor may ask you to wear a small electronic device for 1 or 2 days to monitor your heart. It is called a Holter monitor. Follow-up care is a key part of your treatment and safety. Be sure to make and go to all appointments, and call your doctor if you are having problems. It's also a good idea to know your test results and keep a list of the medicines you take. How can you care for yourself at home? · Be safe with medicines. Take your medicines exactly as prescribed. Call your doctor if you think you are having a problem with your medicine. You will get more details on the specific medicines your doctor prescribes. · If your doctor showed you how to do vagal maneuvers, try them when you have an episode. These maneuvers include bearing down or putting an ice-cold, wet towel on your face. · Monitor your condition by keeping a diary of your SVT episodes. Bring this to your doctor appointments. ¨ Write down how fast or slow your heart was beating. To count your heart rate: 
§ Gently place 2 fingers of your hand on the inside of your other wrist, below your thumb. § Count the beats for 30 seconds. § Then, double the result to get the number of beats per minute. ¨ Write down if your heart rhythm was regular or irregular. ¨ Write down the symptoms you had. ¨ Write down the time of day your symptoms occurred. ¨ Write down how long your symptoms lasted. ¨ Write down what you were doing when your symptoms started. ¨ Write down what may have helped your symptoms go away. · If they trigger episodes, limit or avoid alcohol or drinks with caffeine. · Do not use over-the-counter decongestants, herbal remedies, diet pills, or \"pep\" pills, which often contain stimulants. · Do not use illegal drugs, such as cocaine, ecstasy, or methamphetamine, which can speed up your heart's rhythm. · Do not smoke. Smoking can make this condition worse. If you need help quitting, talk to your doctor about stop-smoking programs and medicines. These can increase your chances of quitting for good. · Be alert for new or worsening symptoms, such as shortness of breath, pounding of your heart, or unusual tiredness. If new symptoms develop or your symptoms become worse, call your doctor. When should you call for help? Call 911 anytime you think you may need emergency care. For example, call if: 
? · You passed out (lost consciousness). ? · You are short of breath. ?Call your doctor now or seek immediate medical care if: 
? · You have a fast heartbeat. ? · You are dizzy or lightheaded, or feel like you may faint. ?Watch closely for changes in your health, and be sure to contact your doctor if: 
? · You do not get better as expected. Where can you learn more? Go to http://jocelyn-sisi.info/. Enter G244 in the search box to learn more about \"Supraventricular Tachycardia: Care Instructions. \" Current as of: September 21, 2016 Content Version: 11.4 © 5153-9254 [employer]. Care instructions adapted under license by [employer] (which disclaims liability or warranty for this information). If you have questions about a medical condition or this instruction, always ask your healthcare professional. [place] [address] any warranty or liability for your use of this information. Introducing [facility]! Dear Lenora Ng: Thank you for requesting a Olocity account. Our records indicate that you already have an active Olocity account. You can access your account anytime at [email or web address] Did you know that you can access your hospital and ER discharge instructions at any time in Olocity? You can also review all of your test results from your hospital stay or ER visit. Additional Information If you have questions, please visit the Frequently Asked Questions section of the Olocity website at [email or web address] Remember, Olocity is NOT to be used for urgent needs. For medical emergencies, dial 911. Now available from your iPhone and Android! Please provide this summary of care documentation to your next provider. Your primary care clinician is listed as Hussein Esparza. If you have any questions after today's visit, please call [phone].

## 2018-07-06 NOTE — PROGRESS NOTES
Chief Complaint   Patient presents with    Medication Evaluation    Medication Refill     Pt states she needs medication refills

## 2018-07-07 NOTE — PROGRESS NOTES
Hali Carvajal is a 70 y.o. female who presents with the following complaints:  Chief Complaint   Patient presents with    Medication Evaluation    Medication Refill       Subjective:    HPI:   Presents for f/u of htn, GERD, chronic back pain, headache, and allergic rhinitis. Reports good compliance with antihypertensives. , tolerating well without apparent se. Generally follows heart healthy diet. No chest pain, dyspnea on exertion, blurred vision, or LE edema. Having intermittent headaches, states pattern is unchanged, no increase in frequency or severity. Reports good compliance with topamax. States she is out of fiorcet and is requesting refill. Continues to slowly improve from her back surgery and post op infection. Being followed by orthopedic surgeon. Elevated HR noted during rooming- patient was unaware of fast heart rate, reports feels fine, no palpitations, no chest pain, no dyspnea on exertion. She reports hx of SVT in the past, has cardiologist. She reports her cardiologist has taught her to \"break\" the rhythm with valsalva maneuver. Pertinent PMH/FH/SH:  Past Medical History:   Diagnosis Date    Headache(784.0)     Hypertension      Past Surgical History:   Procedure Laterality Date    HX HEENT      HX OTHER SURGICAL      Removal of Left eye.      Family History   Problem Relation Age of Onset    Cancer Mother     Stroke Father      Social History     Social History    Marital status: LEGALLY      Spouse name: N/A    Number of children: N/A    Years of education: N/A     Social History Main Topics    Smoking status: Former Smoker    Smokeless tobacco: Never Used    Alcohol use No    Drug use: None    Sexual activity: Not Asked     Other Topics Concern    None     Social History Narrative     Advanced Directives: N      Patient Active Problem List    Diagnosis    Advanced directives, counseling/discussion    Allergic bronchitis    Discomfort    Seasonal allergic rhinitis due to pollen    Essential hypertension    Other allergic rhinitis       Nurse notes were reviewed and are correct  Review of Systems - negative except as listed above in the HPI    Objective:     Vitals:    07/06/18 1109   BP: 124/67   Pulse: (!) 145   Resp: 19   Temp: 97.9 °F (36.6 °C)   TempSrc: Oral   SpO2: 99%   Weight: 236 lb 3.2 oz (107.1 kg)   Height: 5' 10\" (1.778 m)     Physical Examination: General appearance - alert, well appearing, and in no distress, oriented to person, place, and time, overweight and well hydrated  Mental status - agitated  Neck - supple, no significant adenopathy, thyroid exam: thyroid is normal in size without nodules or tenderness  Chest - clear to auscultation, no wheezes, rales or rhonchi, symmetric air entry  Heart - elevated rate, regular rhythm, normal S1, S2, no murmurs, rubs, clicks or gallops, normal bilateral carotid upstroke without bruits, no JVD  Abdomen - soft, nontender, nondistended, no masses or organomegaly  bowel sounds normal  Neurological - alert, oriented, normal speech, no focal findings or movement disorder noted  Extremities - no pedal edema noted  Skin - normal coloration and turgor, no rashes, no suspicious skin lesions noted    EKG: SVT with rate of 150    Assessment/ Plan:   Diagnoses and all orders for this visit:    1. SVT (supraventricular tachycardia) ([address]  The patient attempted valsalva I the office; it did not result in resolution of tachycardia. I advised patient that she should proceed to ED via EMS, she refused. We discussed potential complications of delaying treatment for her arhrythmia, including ischemia, low BP, CHF.  She states she will go home and try other methods to break the rhythm and will call her cardiologist and go the ED right away if rate does not slow   I again attempted to persuade patient against this plan; she became angry and stated, \"just write patient refused on my chart and be done with it\"    2. Essential hypertension with goal blood pressure less than 130/80  at goal  Continue current rx  DASH diet  Weigh tloss  -     amLODIPine (NORVASC) 10 mg tablet; TAKE 1 TABLET EVERY DAY    3. Gastroesophageal reflux disease, esophagitis presence not specified  GERD diet  Continue rx  -     omeprazole (PRILOSEC) 40 mg capsule; TAKE 1 CAPSULE EVERY DAY    4. Cough  -     albuterol (PROVENTIL HFA, VENTOLIN HFA, PROAIR HFA) 90 mcg/actuation inhaler; Take 2 Puffs by inhalation every six (6) hours as needed for Wheezing. 5. Fibromyalgia  Stable  Continue rx  -     gabapentin (NEURONTIN) 300 mg capsule; Take 1 Cap by mouth three (3) times daily. 6. Tachycardia  See dcoumentation above  -     AMB POC EKG ROUTINE W/ 12 LEADS, INTER & REP    Other orders  -     hydrOXYzine pamoate (VISTARIL) 25 mg capsule; TAKE 1 CAPSULE EVERY DAY AS NEEDED  FOR  ITCHING MAY REPEAT ONE TIME DAILY  -     tiZANidine (ZANAFLEX) 4 mg tablet; Take 1 Tab by mouth nightly. -     butalbital-acetaminophen-caffeine (FIORICET, ESGIC) -40 mg per tablet; Take 1 Tab by mouth two (2) times daily as needed for Pain. Do not use more than one day a week. We have discussed that this medication is for occasional use only. I expect her to use no more than once a week. Current rx should last her 6 months. Follow-up Disposition: recommend f/u in the ED right away for treatment of SVT  F/u in office in 1 month    I have discussed the diagnosis with the patient and the intended plan as seen in the above orders. The patient has received an after-visit summary and questions were answered concerning future plans. The patient verbalizes understanding.     Medication Side Effects and Warnings were discussed with patient: yes  Patient Labs were reviewed and or requested: yes  Patient Past Records were reviewed and or requested: yes    Patient Instructions          Supraventricular Tachycardia: Care Instructions  Your Care Instructions    Having supraventricular tachycardia (SVT) means that from time to time your heart beats abnormally fast. This fast rhythm is caused by changes in the electrical system of your heart. You may feel a fluttering in your chest (palpitations) and have a fast pulse. When your heart is beating fast, you may feel anxious and lightheaded, be short of breath, and feel discomfort in the chest.  Your doctor may prescribe medicines to help slow down your heartbeat. Your doctor may also suggest you try vagal maneuvers when having an episode of SVT. These are things, like bearing down, that might help slow your heart rate. Bearing down means that you try to breathe out with your stomach muscles but you don't let air out of your nose or mouth. Your doctor can show you how to do vagal maneuvers. He or she may suggest you lie down on your back to do them. In some cases, either cardioversion treatment or a procedure called catheter ablation is done to correct SVT. Your doctor may ask you to wear a small electronic device for 1 or 2 days to monitor your heart. It is called a Holter monitor. Follow-up care is a key part of your treatment and safety. Be sure to make and go to all appointments, and call your doctor if you are having problems. It's also a good idea to know your test results and keep a list of the medicines you take. How can you care for yourself at home? · Be safe with medicines. Take your medicines exactly as prescribed. Call your doctor if you think you are having a problem with your medicine. You will get more details on the specific medicines your doctor prescribes. · If your doctor showed you how to do vagal maneuvers, try them when you have an episode. These maneuvers include bearing down or putting an ice-cold, wet towel on your face. · Monitor your condition by keeping a diary of your SVT episodes. Bring this to your doctor appointments. ¨ Write down how fast or slow your heart was beating.  To count your heart rate:  § Gently place 2 fingers of your hand on the inside of your other wrist, below your thumb. § Count the beats for 30 seconds. § Then, double the result to get the number of beats per minute. ¨ Write down if your heart rhythm was regular or irregular. ¨ Write down the symptoms you had. ¨ Write down the time of day your symptoms occurred. ¨ Write down how long your symptoms lasted. ¨ Write down what you were doing when your symptoms started. ¨ Write down what may have helped your symptoms go away. · If they trigger episodes, limit or avoid alcohol or drinks with caffeine. · Do not use over-the-counter decongestants, herbal remedies, diet pills, or \"pep\" pills, which often contain stimulants. · Do not use illegal drugs, such as cocaine, ecstasy, or methamphetamine, which can speed up your heart's rhythm. · Do not smoke. Smoking can make this condition worse. If you need help quitting, talk to your doctor about stop-smoking programs and medicines. These can increase your chances of quitting for good. · Be alert for new or worsening symptoms, such as shortness of breath, pounding of your heart, or unusual tiredness. If new symptoms develop or your symptoms become worse, call your doctor. When should you call for help? Call 911 anytime you think you may need emergency care. For example, call if:  ? · You passed out (lost consciousness). ? · You are short of breath. ?Call your doctor now or seek immediate medical care if:  ? · You have a fast heartbeat. ? · You are dizzy or lightheaded, or feel like you may faint. ? Watch closely for changes in your health, and be sure to contact your doctor if:  ? · You do not get better as expected. Where can you learn more? Go to http://jocelyn-sisi.info/. Enter G244 in the search box to learn more about \"Supraventricular Tachycardia: Care Instructions. \"  Current as of: September 21, 2016  Content Version: 11.4  © 0263-8347 Healthwise, [employer]. Care instructions adapted under license by [employer] (which disclaims liability or warranty for this information). If you have questions about a medical condition or this instruction, always ask your healthcare professional. [address] any warranty or liability for your use of this information.           Shayna Hewitt [facility]

## 2018-07-11 NOTE — TELEPHONE ENCOUNTER
Cardiology referral already in system; the provider information has been updated by the referral coordinator.

## 2018-09-06 ENCOUNTER — TELEPHONE (OUTPATIENT)
Dept: FAMILY MEDICINE CLINIC | Age: 63
End: 2018-09-06

## 2018-09-06 NOTE — TELEPHONE ENCOUNTER
Pt states she would like to have flexeril for during the day, and take the Zanaflex at night. I advised the pt that she is not going to be placed on two muscle relaxers, and insurance will not pay for two drugs of the same class. Pt states she would rather have the flexeril because she is unable to stay awake during the day if she has to take the Zanaflex.

## 2018-09-07 RX ORDER — CYCLOBENZAPRINE HCL 5 MG
5 TABLET ORAL
Qty: 90 TAB | Refills: 1 | Status: SHIPPED | OUTPATIENT
Start: 2018-09-07 | End: 2018-11-09 | Stop reason: SDUPTHER

## 2018-09-24 DIAGNOSIS — I10 ESSENTIAL HYPERTENSION WITH GOAL BLOOD PRESSURE LESS THAN 130/80: ICD-10-CM

## 2018-09-24 DIAGNOSIS — M79.7 FIBROMYALGIA: ICD-10-CM

## 2018-09-24 DIAGNOSIS — K21.9 GASTROESOPHAGEAL REFLUX DISEASE, ESOPHAGITIS PRESENCE NOT SPECIFIED: ICD-10-CM

## 2018-09-24 RX ORDER — GABAPENTIN 300 MG/1
CAPSULE ORAL
Qty: 270 CAP | Refills: 0 | Status: SHIPPED | OUTPATIENT
Start: 2018-09-24

## 2018-09-24 RX ORDER — HYDROXYZINE PAMOATE 25 MG/1
CAPSULE ORAL
Qty: 90 CAP | Refills: 1 | Status: SHIPPED | OUTPATIENT
Start: 2018-09-24

## 2018-09-24 RX ORDER — METOPROLOL SUCCINATE 25 MG/1
TABLET, EXTENDED RELEASE ORAL
Qty: 90 TAB | Refills: 1 | Status: SHIPPED | OUTPATIENT
Start: 2018-09-24

## 2018-09-24 RX ORDER — OMEPRAZOLE 40 MG/1
CAPSULE, DELAYED RELEASE ORAL
Qty: 90 CAP | Refills: 1 | Status: SHIPPED | OUTPATIENT
Start: 2018-09-24

## 2018-11-09 ENCOUNTER — OFFICE VISIT (OUTPATIENT)
Dept: FAMILY MEDICINE CLINIC | Age: 63
End: 2018-11-09

## 2018-11-09 VITALS
TEMPERATURE: 98.3 F | DIASTOLIC BLOOD PRESSURE: 80 MMHG | WEIGHT: 242.3 LBS | BODY MASS INDEX: 34.69 KG/M2 | OXYGEN SATURATION: 94 % | SYSTOLIC BLOOD PRESSURE: 131 MMHG | RESPIRATION RATE: 18 BRPM | HEIGHT: 70 IN | HEART RATE: 76 BPM

## 2018-11-09 DIAGNOSIS — M67.40 GANGLION CYST: ICD-10-CM

## 2018-11-09 DIAGNOSIS — G89.29 CHRONIC LOW BACK PAIN WITHOUT SCIATICA, UNSPECIFIED BACK PAIN LATERALITY: Primary | ICD-10-CM

## 2018-11-09 DIAGNOSIS — M54.50 CHRONIC LOW BACK PAIN WITHOUT SCIATICA, UNSPECIFIED BACK PAIN LATERALITY: Primary | ICD-10-CM

## 2018-11-09 RX ORDER — DILTIAZEM HYDROCHLORIDE 120 MG/1
CAPSULE, EXTENDED RELEASE ORAL
COMMUNITY
Start: 2018-11-07

## 2018-11-09 RX ORDER — CYCLOBENZAPRINE HCL 5 MG
5 TABLET ORAL
Qty: 90 TAB | Refills: 0 | Status: SHIPPED | OUTPATIENT
Start: 2018-11-09

## 2018-11-09 NOTE — PROGRESS NOTES
Osvaldo Mckinney is a 70 y.o. female who presents with the following complaints: Chief Complaint Patient presents with  Hypertension  Hand Pain Right hand with movable area under skin. Patient states she noticed in 3/18 but now the area is getting bigger and pain to touch Subjective: HPI:  
Presents for f/u of low back pain and for nodule in right hand. She expresses dissatisfaction when I entered the room as she thought she was seeing Dr. Graciela Clark today. C/o mobile, soft nodule right hand between middle and ring fingers, getting larger and more tender to touch. Has been present for about 8 months. She believes it is related to her back surgery as she never had anything like this before. Expresses dissatisfaction with pain management provider that she was referred to for treatment of low back pain, states injections helped with the pain but made her legs numb and she had to use a wheelchair. She is ambulating with her cane now. She'd like a refill on her flexeril. Pertinent PMH/FH/SH: 
Past Medical History:  
Diagnosis Date  Headache(784.0)  Hypertension Past Surgical History:  
Procedure Laterality Date  HX HEENT    
 HX OTHER SURGICAL Removal of Left eye.  OR INC IMPLTJ NEUROSTIMULATOR ELTRD SACRAL NERVE  2018 Family History Problem Relation Age of Onset  Cancer Mother  Stroke Father Social History Socioeconomic History  Marital status: LEGALLY  Spouse name: Not on file  Number of children: Not on file  Years of education: Not on file  Highest education level: Not on file Social Needs  Financial resource strain: Not on file  Food insecurity - worry: Not on file  Food insecurity - inability: Not on file  Transportation needs - medical: Not on file  Transportation needs - non-medical: Not on file Occupational History  Not on file Tobacco Use  Smoking status: Former Smoker  Smokeless tobacco: Never Used Substance and Sexual Activity  Alcohol use: No  
 Drug use: Not on file  Sexual activity: Not on file Other Topics Concern  Not on file Social History Narrative  Not on file Advanced Directives: N 
 
 
Patient Active Problem List  
 Diagnosis  Advanced directives, counseling/discussion  Allergic bronchitis  Discomfort  Seasonal allergic rhinitis due to pollen  Essential hypertension  Other allergic rhinitis Nurse notes were reviewed and are correct Review of Systems - negative except as listed above in the HPI Objective:  
 
Vitals:  
 11/09/18 4583 BP: 131/80 Pulse: 76 Resp: 18 Temp: 98.3 °F (36.8 °C) TempSrc: Oral  
SpO2: 94% Weight: 242 lb 4.8 oz (109.9 kg) Height: 5' 10\" (1.778 m) Physical Examination: General appearance - alert, well appearing, and in no distress, oriented to person, place, and time and overweight Mental status - agitated Neck - supple, no significant adenopathy Neurological - alert, oriented, normal speech, no focal findings or movement disorder noted Musculoskeletal - abnormal exam of right hand with 1 cm soft, mobile cyst, mildly tender, between the 3rd and 4th fingers, moving proximally from the MCP into the hand Extremities - no pedal edema noted Skin - normal coloration and turgor Assessment/ Plan:  
Diagnoses and all orders for this visit: 
 
1. Chronic low back pain without sciatica, unspecified back pain laterality F/u with pain management, consider alternate provider if dissatisfied 
-     cyclobenzaprine (FLEXERIL) 5 mg tablet; Take 1 Tab by mouth three (3) times daily as needed for Muscle Spasm(s). 2. Ganglion cyst 
Discussed options with patients including observation (many cysts resolve spontaneously) or referral to surgeon for excision Patient became agitated and angry, states she should not have to have surgery, we should be able to use a needle to take care of cyst here. Advised that using needle to evacuate would not be an effective treatment, fluid will re-accumulate Patient stated she had thought she was seeing Dr. Kristina Fenton today, stated \"you don't know what you are doing, there is no reason that you can't put a needle in this cyst today. \" She expressed her intention to [employer] my time with you again, I'll only see Dr. Kristina Fenton. \"  She marilu from her chair and stated, \"you'd better send that flexeril to my pharmacy. \" This is the second encounter that I have had with Mrs Cindy Mcguire where she became angry and agitated, speaking in an abusive and disrespectful manner when she was unhappy with recommended treatment plan. On the last occasion she had sustained HR of 145 and became angry and agitated when I recommended further eval and treatment in the ED. The provider-patient relationship had deteriorated to the point where it can no longer be effective or productive. She will be released from the practice. Follow-up Disposition: establish with new PCP as soon as possible. I have discussed the diagnosis with the patient and the intended plan as seen in the above orders. The patient has received an after-visit summary and questions were answered concerning future plans. The patient verbalizes understanding. Medication Side Effects and Warnings were discussed with patient: yes Patient Labs were reviewed and or requested: yes Patient Past Records were reviewed and or requested: yes Patient Instructions Back Pain: Care Instructions Your Care Instructions Back pain has many possible causes. It is often related to problems with muscles and ligaments of the back. It may also be related to problems with the nerves, discs, or bones of the back. Moving, lifting, standing, sitting, or sleeping in an awkward way can strain the back.  Sometimes you don't notice the injury until later. Arthritis is another common cause of back pain. Although it may hurt a lot, back pain usually improves on its own within several weeks. Most people recover in 12 weeks or less. Using good home treatment and being careful not to stress your back can help you feel better sooner. Follow-up care is a key part of your treatment and safety. Be sure to make and go to all appointments, and call your doctor if you are having problems. It's also a good idea to know your test results and keep a list of the medicines you take. How can you care for yourself at home? · Sit or lie in positions that are most comfortable and reduce your pain. Try one of these positions when you lie down: ? Lie on your back with your knees bent and supported by large pillows. ? Lie on the floor with your legs on the seat of a sofa or chair. ? Lie on your side with your knees and hips bent and a pillow between your legs. ? Lie on your stomach if it does not make pain worse. · Do not sit up in bed, and avoid soft couches and twisted positions. Bed rest can help relieve pain at first, but it delays healing. Avoid bed rest after the first day of back pain. · Change positions every 30 minutes. If you must sit for long periods of time, take breaks from sitting. Get up and walk around, or lie in a comfortable position. · Try using a heating pad on a low or medium setting for 15 to 20 minutes every 2 or 3 hours. Try a warm shower in place of one session with the heating pad. · You can also try an ice pack for 10 to 15 minutes every 2 to 3 hours. Put a thin cloth between the ice pack and your skin. · Take pain medicines exactly as directed. ? If the doctor gave you a prescription medicine for pain, take it as prescribed. ? If you are not taking a prescription pain medicine, ask your doctor if you can take an over-the-counter medicine. · Take short walks several times a day.  You can start with 5 to 10 minutes, 3 or 4 times a day, and work up to longer walks. Walk on level surfaces and avoid hills and stairs until your back is better. · Return to work and other activities as soon as you can. Continued rest without activity is usually not good for your back. · To prevent future back pain, do exercises to stretch and strengthen your back and stomach. Learn how to use good posture, safe lifting techniques, and proper body mechanics. When should you call for help? Call your doctor now or seek immediate medical care if: 
  · You have new or worsening numbness in your legs.  
  · You have new or worsening weakness in your legs. (This could make it hard to stand up.)  
  · You lose control of your bladder or bowels.  
 Watch closely for changes in your health, and be sure to contact your doctor if: 
  · You have a fever, lose weight, or don't feel well.  
  · You do not get better as expected. Where can you learn more? Go to http://jocelyn-sisi.info/. Enter A263 in the search box to learn more about \"Back Pain: Care Instructions. \" Current as of: November 29, 2017 Content Version: 11.8 © 2556-3034 Healthwise, [employer]. Care instructions adapted under license by [employer] (which disclaims liability or warranty for this information). If you have questions about a medical condition or this instruction, always ask your healthcare professional. [address] any warranty or liability for your use of this information.  
 
 
 
 
 
Fan HOLLINS

## 2018-11-09 NOTE — PROGRESS NOTES
Duyen Shine is a 70 y.o. female Patient would like a refill on Flexeril Mary [facility]) and would like to use Symbicort inhaler Patient declined flu vaccine Chief Complaint Patient presents with  Hypertension  Hand Pain Right hand with movable area under skin. Patient states she noticed in 3/18 but now the area is getting bigger and pain to touch 1. Have you been to the ER, urgent care clinic since your last visit? Hospitalized since your last visit? No 
M 
2. Have you seen or consulted any other health care providers outside of the [address] [place] [address] since your last visit? Include any pap smears or colon screening. No 
 
 
Visit Vitals /80 (BP 1 Location: Left arm, BP Patient Position: Sitting) Pulse 76 Temp 98.3 °F (36.8 °C) (Oral) Resp 18 Ht 5' 10\" (1.778 m) Wt 242 lb 4.8 oz (109.9 kg) SpO2 94% BMI 34.77 kg/m² Health Maintenance Due Topic Date Due  
 Hepatitis C Screening  1955  DTaP/Tdap/Td series (1 - Tdap) 06/24/1974  Shingrix Vaccine Age 50> (1 of 2) 06/24/2003  GLAUCOMA SCREENING Q2Y  06/24/2018  Bone Densitometry (Dexa) Screening  06/24/2018  Pneumococcal 65+ Low/Medium Risk (1 of 2 - PCV13) 06/24/2018  Influenza Age 5 to Adult  08/01/2018 Medication Reconciliation completed, changes noted.   Please  Update medication list.

## 2018-11-09 NOTE — LETTER
11/9/2018 Ms. Toña Luna [id number] [place] [address] Dear Ms. Jacobs, 
 
A good relationship between provider and patient is essential for quality medical care. The provider/patient relationship depends upon mutual trust, respect, and rapport. Our relationship has reached a point where these criteria are no longer intact. There are significant philosophical differences in our views of medical care and treatment. Your behavior toward me at your last two visits has been rude and disrespectful. For these reasons, I will no longer continue to serve as your provider. and BRITTA Basurto must withdraw from your medical care and treatment effective this date. Although I have terminated our providerpatient relationship, I will assist you with the transition of your health care to other providers, as follows: 
 
1. On-Going/Acute Care. I will provide on-going/acute care for you for a period of 30 days. After this date, you may seek care with another physician or your local emergency room. 2. Referrals for Future Medical Care. As you may need medical attention in the future, I recommend that you promptly find another physician to care for you. You may require ongoing medical attention for any current or future medical conditions. You may contact your insurance company or the [employer] for a [employer] at [facility] [employer] of physicians who are accepting new patients 3. Medical Records. I will provide you or your new health care provider with a copy of your records upon your request.  Since your records are confidential, we will require your written authorization to make them available to another physician. Enclosed is an authorization form. Please complete it and return it to me so that we may transition your care. I extend to you my best wishes for your future health.  
 
Sincerely, 
 
 
 
Yan Leroy NP 
 
 Enclosure

## 2018-11-09 NOTE — PATIENT INSTRUCTIONS
Back Pain: Care Instructions Your Care Instructions Back pain has many possible causes. It is often related to problems with muscles and ligaments of the back. It may also be related to problems with the nerves, discs, or bones of the back. Moving, lifting, standing, sitting, or sleeping in an awkward way can strain the back. Sometimes you don't notice the injury until later. Arthritis is another common cause of back pain. Although it may hurt a lot, back pain usually improves on its own within several weeks. Most people recover in 12 weeks or less. Using good home treatment and being careful not to stress your back can help you feel better sooner. Follow-up care is a key part of your treatment and safety. Be sure to make and go to all appointments, and call your doctor if you are having problems. It's also a good idea to know your test results and keep a list of the medicines you take. How can you care for yourself at home? · Sit or lie in positions that are most comfortable and reduce your pain. Try one of these positions when you lie down: ? Lie on your back with your knees bent and supported by large pillows. ? Lie on the floor with your legs on the seat of a sofa or chair. ? Lie on your side with your knees and hips bent and a pillow between your legs. ? Lie on your stomach if it does not make pain worse. · Do not sit up in bed, and avoid soft couches and twisted positions. Bed rest can help relieve pain at first, but it delays healing. Avoid bed rest after the first day of back pain. · Change positions every 30 minutes. If you must sit for long periods of time, take breaks from sitting. Get up and walk around, or lie in a comfortable position. · Try using a heating pad on a low or medium setting for 15 to 20 minutes every 2 or 3 hours. Try a warm shower in place of one session with the heating pad. · You can also try an ice pack for 10 to 15 minutes every 2 to 3 hours. Put a thin cloth between the ice pack and your skin. · Take pain medicines exactly as directed. ? If the doctor gave you a prescription medicine for pain, take it as prescribed. ? If you are not taking a prescription pain medicine, ask your doctor if you can take an over-the-counter medicine. · Take short walks several times a day. You can start with 5 to 10 minutes, 3 or 4 times a day, and work up to longer walks. Walk on level surfaces and avoid hills and stairs until your back is better. · Return to work and other activities as soon as you can. Continued rest without activity is usually not good for your back. · To prevent future back pain, do exercises to stretch and strengthen your back and stomach. Learn how to use good posture, safe lifting techniques, and proper body mechanics. When should you call for help? Call your doctor now or seek immediate medical care if: 
  · You have new or worsening numbness in your legs.  
  · You have new or worsening weakness in your legs. (This could make it hard to stand up.)  
  · You lose control of your bladder or bowels.  
 Watch closely for changes in your health, and be sure to contact your doctor if: 
  · You have a fever, lose weight, or don't feel well.  
  · You do not get better as expected. Where can you learn more? Go to http://jocelyn-sisi.info/. Enter H909 in the search box to learn more about \"Back Pain: Care Instructions. \" Current as of: November 29, 2017 Content Version: 11.8 © 6467-4854 [employer]. Care instructions adapted under license by [employer] (which disclaims liability or warranty for this information). If you have questions about a medical condition or this instruction, always ask your healthcare professional. [place] [address] any warranty or liability for your use of this information.

## 2018-12-11 DIAGNOSIS — M79.7 FIBROMYALGIA: ICD-10-CM

## 2018-12-11 RX ORDER — TOPIRAMATE 50 MG/1
50 TABLET, FILM COATED ORAL 2 TIMES DAILY
Qty: 180 TAB | Refills: 1 | OUTPATIENT
Start: 2018-12-11

## 2018-12-11 RX ORDER — HYDROXYZINE PAMOATE 25 MG/1
CAPSULE ORAL
Qty: 90 CAP | Refills: 1 | OUTPATIENT
Start: 2018-12-11

## 2018-12-11 RX ORDER — GABAPENTIN 300 MG/1
CAPSULE ORAL
Qty: 270 CAP | Refills: 1 | OUTPATIENT
Start: 2018-12-11

## 2019-09-28 ENCOUNTER — HEALTH MAINTENANCE LETTER (OUTPATIENT)
Age: 64
End: 2019-09-28

## 2020-03-15 ENCOUNTER — HEALTH MAINTENANCE LETTER (OUTPATIENT)
Age: 65
End: 2020-03-15

## 2020-10-12 DIAGNOSIS — M25.612 DECREASED RANGE OF MOTION OF LEFT SHOULDER: ICD-10-CM
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
## 2020-10-12 DIAGNOSIS — M25.512 ACUTE PAIN OF LEFT SHOULDER: Primary | ICD-10-CM

## 2020-10-12 DIAGNOSIS — R29.898 LEFT ARM WEAKNESS: ICD-10-CM

## 2020-10-12 NOTE — PROGRESS NOTES
Patient is a resident at [facility] and has been working with PT/OT. Xray of left shoulder with osteoarthritis with no fracture. Reduced range of motion, acute pain continues, inability to elevated, internal rotate. Pain not relieved with ice. Reports muscle weakness, but no tingling or numbness. Want to rule out rotator cuff tear. Had ground level fall last week, but states that she didn't hit her shoulder and pain has been ongoing. Has been working with PT, but hasn't made any progress in respects to that left shoulder.

## 2020-10-15 ENCOUNTER — HOSPITAL ENCOUNTER (OUTPATIENT)
Dept: MRI IMAGING | Age: 65
Discharge: HOME | End: 2020-10-15
Attending: NURSE PRACTITIONER

## 2020-10-15 DIAGNOSIS — R29.898 LEFT ARM WEAKNESS: ICD-10-CM

## 2020-10-15 DIAGNOSIS — M25.512 ACUTE PAIN OF LEFT SHOULDER: ICD-10-CM

## 2020-10-15 DIAGNOSIS — M25.612 DECREASED RANGE OF MOTION OF LEFT SHOULDER: ICD-10-CM

## 2021-01-10 ENCOUNTER — HEALTH MAINTENANCE LETTER (OUTPATIENT)
Age: 66
End: 2021-01-10

## 2021-05-08 ENCOUNTER — HEALTH MAINTENANCE LETTER (OUTPATIENT)
Age: 66
End: 2021-05-08

## 2021-10-23 ENCOUNTER — HEALTH MAINTENANCE LETTER (OUTPATIENT)
Age: 66
End: 2021-10-23

## 2022-03-19 PROBLEM — Z78.9 DISCOMFORT: Status: ACTIVE | Noted: 2017-02-20

## 2022-03-19 PROBLEM — J30.1 SEASONAL ALLERGIC RHINITIS DUE TO POLLEN: Status: ACTIVE | Noted: 2017-02-20

## 2022-03-19 PROBLEM — J45.909 ALLERGIC BRONCHITIS: Status: ACTIVE | Noted: 2017-02-20

## 2022-03-20 PROBLEM — Z71.89 ADVANCED DIRECTIVES, COUNSELING/DISCUSSION: Status: ACTIVE | Noted: 2018-04-03

## 2022-06-04 ENCOUNTER — HEALTH MAINTENANCE LETTER (OUTPATIENT)
Age: 67
End: 2022-06-04

## 2022-10-10 ENCOUNTER — HEALTH MAINTENANCE LETTER (OUTPATIENT)
Age: 67
End: 2022-10-10

## 2023-05-25 RX ORDER — DILTIAZEM HYDROCHLORIDE 120 MG/1
CAPSULE, COATED, EXTENDED RELEASE ORAL
COMMUNITY
Start: 2018-11-07

## 2023-05-25 RX ORDER — DICLOFENAC SODIUM 75 MG/1
75 TABLET, DELAYED RELEASE ORAL 2 TIMES DAILY
COMMUNITY
Start: 2016-10-05

## 2023-05-25 RX ORDER — AMLODIPINE BESYLATE 10 MG/1
1 TABLET ORAL DAILY
COMMUNITY
Start: 2018-07-06

## 2023-05-25 RX ORDER — OMEPRAZOLE 40 MG/1
CAPSULE, DELAYED RELEASE ORAL
COMMUNITY
Start: 2018-09-24

## 2023-05-25 RX ORDER — MAGNESIUM OXIDE 400 MG/1
400 TABLET ORAL DAILY
COMMUNITY
Start: 2015-08-06

## 2023-05-25 RX ORDER — ALBUTEROL SULFATE 90 UG/1
2 AEROSOL, METERED RESPIRATORY (INHALATION) EVERY 6 HOURS PRN
COMMUNITY
Start: 2018-07-06

## 2023-05-25 RX ORDER — ONDANSETRON 4 MG/1
4 TABLET, ORALLY DISINTEGRATING ORAL EVERY 8 HOURS PRN
COMMUNITY
Start: 2018-03-20

## 2023-05-25 RX ORDER — FLUTICASONE PROPIONATE 50 MCG
SPRAY, SUSPENSION (ML) NASAL
COMMUNITY
Start: 2017-03-19

## 2023-05-25 RX ORDER — IBUPROFEN 600 MG/1
600 TABLET ORAL EVERY 6 HOURS PRN
COMMUNITY
Start: 2017-02-20

## 2023-05-25 RX ORDER — DIPHENOXYLATE HYDROCHLORIDE AND ATROPINE SULFATE 2.5; .025 MG/1; MG/1
1 TABLET ORAL 4 TIMES DAILY PRN
COMMUNITY
Start: 2017-06-20

## 2023-05-25 RX ORDER — METOPROLOL SUCCINATE 25 MG/1
1 TABLET, EXTENDED RELEASE ORAL DAILY
COMMUNITY
Start: 2018-09-24

## 2023-05-25 RX ORDER — TOPIRAMATE 50 MG/1
50 TABLET, FILM COATED ORAL 2 TIMES DAILY
COMMUNITY
Start: 2018-03-02

## 2023-05-25 RX ORDER — CYCLOBENZAPRINE HCL 5 MG
5 TABLET ORAL 3 TIMES DAILY PRN
COMMUNITY
Start: 2018-11-09

## 2023-05-25 RX ORDER — HYDROXYZINE PAMOATE 25 MG/1
CAPSULE ORAL
COMMUNITY
Start: 2018-09-24

## 2023-05-25 RX ORDER — GABAPENTIN 300 MG/1
CAPSULE ORAL
COMMUNITY
Start: 2018-09-24

## 2023-05-25 RX ORDER — BUTALBITAL, ACETAMINOPHEN AND CAFFEINE 50; 325; 40 MG/1; MG/1; MG/1
1 TABLET ORAL 2 TIMES DAILY PRN
COMMUNITY
Start: 2018-07-06

## 2023-06-10 ENCOUNTER — HEALTH MAINTENANCE LETTER (OUTPATIENT)
Age: 68
End: 2023-06-10

## 2023-10-28 ENCOUNTER — HEALTH MAINTENANCE LETTER (OUTPATIENT)
Age: 68
End: 2023-10-28

## 2024-10-20 NOTE — TELEPHONE ENCOUNTER
Patient would like to inform provider that she is going to have her surgery done on January 2, 2018 with Dr. Francine Guerra. (M6) obeys commands